# Patient Record
Sex: MALE | Race: WHITE | Employment: OTHER | URBAN - METROPOLITAN AREA
[De-identification: names, ages, dates, MRNs, and addresses within clinical notes are randomized per-mention and may not be internally consistent; named-entity substitution may affect disease eponyms.]

---

## 2017-05-04 ENCOUNTER — HOSPITAL ENCOUNTER (OUTPATIENT)
Dept: OTHER | Age: 82
Discharge: HOME | End: 2017-05-04
Attending: INTERNAL MEDICINE | Admitting: INTERNAL MEDICINE

## 2017-05-10 ENCOUNTER — OFFICE VISIT (OUTPATIENT)
Dept: CARDIOLOGY CLINIC | Age: 82
End: 2017-05-10

## 2017-05-10 VITALS
SYSTOLIC BLOOD PRESSURE: 110 MMHG | HEIGHT: 72 IN | DIASTOLIC BLOOD PRESSURE: 70 MMHG | HEART RATE: 94 BPM | BODY MASS INDEX: 29.39 KG/M2 | OXYGEN SATURATION: 96 % | WEIGHT: 217 LBS

## 2017-05-10 DIAGNOSIS — I42.9 CARDIOMYOPATHY (HCC): ICD-10-CM

## 2017-05-10 DIAGNOSIS — I49.9 IRREGULAR HEART BEAT: ICD-10-CM

## 2017-05-10 DIAGNOSIS — I35.0 NONRHEUMATIC AORTIC VALVE STENOSIS: Primary | ICD-10-CM

## 2017-05-10 DIAGNOSIS — I34.0 NON-RHEUMATIC MITRAL REGURGITATION: Chronic | ICD-10-CM

## 2017-05-10 DIAGNOSIS — I25.10 CORONARY ARTERY DISEASE INVOLVING NATIVE CORONARY ARTERY OF NATIVE HEART WITHOUT ANGINA PECTORIS: ICD-10-CM

## 2017-05-10 PROCEDURE — 93000 ELECTROCARDIOGRAM COMPLETE: CPT | Performed by: INTERNAL MEDICINE

## 2017-05-10 PROCEDURE — 4040F PNEUMOC VAC/ADMIN/RCVD: CPT | Performed by: INTERNAL MEDICINE

## 2017-05-10 PROCEDURE — G8420 CALC BMI NORM PARAMETERS: HCPCS | Performed by: INTERNAL MEDICINE

## 2017-05-10 PROCEDURE — G8427 DOCREV CUR MEDS BY ELIG CLIN: HCPCS | Performed by: INTERNAL MEDICINE

## 2017-05-10 PROCEDURE — G8598 ASA/ANTIPLAT THER USED: HCPCS | Performed by: INTERNAL MEDICINE

## 2017-05-10 PROCEDURE — 1123F ACP DISCUSS/DSCN MKR DOCD: CPT | Performed by: INTERNAL MEDICINE

## 2017-05-10 PROCEDURE — 99215 OFFICE O/P EST HI 40 MIN: CPT | Performed by: INTERNAL MEDICINE

## 2017-05-10 PROCEDURE — 1036F TOBACCO NON-USER: CPT | Performed by: INTERNAL MEDICINE

## 2017-05-10 RX ORDER — TAMSULOSIN HYDROCHLORIDE 0.4 MG/1
0.4 CAPSULE ORAL DAILY
Status: ON HOLD | COMMUNITY
End: 2019-09-11

## 2017-05-10 ASSESSMENT — ENCOUNTER SYMPTOMS
EYE REDNESS: 0
WHEEZING: 0
BLOOD IN STOOL: 0
COUGH: 0
ABDOMINAL DISTENTION: 0
SHORTNESS OF BREATH: 1

## 2017-05-16 RX ORDER — WARFARIN SODIUM 5 MG/1
5 TABLET ORAL DAILY
Status: ON HOLD | COMMUNITY
End: 2017-06-09 | Stop reason: SDUPTHER

## 2017-05-16 RX ORDER — WARFARIN SODIUM 5 MG/1
5 TABLET ORAL DAILY
Qty: 30 TABLET | Refills: 3 | Status: CANCELLED | OUTPATIENT
Start: 2017-05-16

## 2017-05-17 ENCOUNTER — OFFICE VISIT (OUTPATIENT)
Dept: CARDIOLOGY CLINIC | Age: 82
End: 2017-05-17

## 2017-05-17 VITALS
OXYGEN SATURATION: 99 % | WEIGHT: 204 LBS | HEIGHT: 72 IN | SYSTOLIC BLOOD PRESSURE: 108 MMHG | BODY MASS INDEX: 27.63 KG/M2 | HEART RATE: 63 BPM | DIASTOLIC BLOOD PRESSURE: 62 MMHG

## 2017-05-17 DIAGNOSIS — I10 ESSENTIAL HYPERTENSION: ICD-10-CM

## 2017-05-17 DIAGNOSIS — I35.0 SEVERE AORTIC STENOSIS: Primary | ICD-10-CM

## 2017-05-17 DIAGNOSIS — E78.00 PURE HYPERCHOLESTEROLEMIA: ICD-10-CM

## 2017-05-17 DIAGNOSIS — I50.22 CHRONIC SYSTOLIC HEART FAILURE (HCC): ICD-10-CM

## 2017-05-17 DIAGNOSIS — I50.43 ACUTE ON CHRONIC SYSTOLIC AND DIASTOLIC HEART FAILURE, NYHA CLASS 4 (HCC): ICD-10-CM

## 2017-05-17 DIAGNOSIS — I48.0 PAROXYSMAL ATRIAL FIBRILLATION (HCC): ICD-10-CM

## 2017-05-17 DIAGNOSIS — I25.10 CORONARY ARTERY DISEASE INVOLVING NATIVE CORONARY ARTERY OF NATIVE HEART WITHOUT ANGINA PECTORIS: ICD-10-CM

## 2017-05-17 DIAGNOSIS — I34.0 SEVERE MITRAL REGURGITATION: ICD-10-CM

## 2017-05-17 PROCEDURE — G8420 CALC BMI NORM PARAMETERS: HCPCS | Performed by: INTERNAL MEDICINE

## 2017-05-17 PROCEDURE — 93000 ELECTROCARDIOGRAM COMPLETE: CPT | Performed by: INTERNAL MEDICINE

## 2017-05-17 PROCEDURE — 4040F PNEUMOC VAC/ADMIN/RCVD: CPT | Performed by: INTERNAL MEDICINE

## 2017-05-17 PROCEDURE — 1036F TOBACCO NON-USER: CPT | Performed by: INTERNAL MEDICINE

## 2017-05-17 PROCEDURE — 99215 OFFICE O/P EST HI 40 MIN: CPT | Performed by: INTERNAL MEDICINE

## 2017-05-17 PROCEDURE — G8598 ASA/ANTIPLAT THER USED: HCPCS | Performed by: INTERNAL MEDICINE

## 2017-05-17 PROCEDURE — G8427 DOCREV CUR MEDS BY ELIG CLIN: HCPCS | Performed by: INTERNAL MEDICINE

## 2017-05-17 PROCEDURE — 1123F ACP DISCUSS/DSCN MKR DOCD: CPT | Performed by: INTERNAL MEDICINE

## 2017-05-17 RX ORDER — ASPIRIN 81 MG/1
81 TABLET ORAL DAILY
Qty: 30 TABLET | Refills: 3 | Status: SHIPPED | OUTPATIENT
Start: 2017-05-17 | End: 2017-06-15 | Stop reason: SDUPTHER

## 2017-05-17 RX ORDER — WARFARIN SODIUM 5 MG/1
5 TABLET ORAL DAILY
Qty: 30 TABLET | Refills: 3 | Status: SHIPPED | OUTPATIENT
Start: 2017-05-17 | End: 2019-08-08 | Stop reason: SDUPTHER

## 2017-05-17 RX ORDER — ASPIRIN/CALCIUM/MAG/ALUMINUM 325 MG
TABLET ORAL
COMMUNITY
End: 2017-05-17

## 2017-05-17 RX ORDER — FUROSEMIDE 40 MG/1
40 TABLET ORAL DAILY
COMMUNITY
End: 2017-06-15 | Stop reason: SDUPTHER

## 2017-05-21 PROBLEM — I50.43 ACUTE ON CHRONIC SYSTOLIC AND DIASTOLIC HEART FAILURE, NYHA CLASS 4 (HCC): Status: ACTIVE | Noted: 2017-05-21

## 2017-05-22 ENCOUNTER — TELEPHONE (OUTPATIENT)
Dept: CARDIOLOGY CLINIC | Age: 82
End: 2017-05-22

## 2017-05-22 DIAGNOSIS — I35.0 NONRHEUMATIC AORTIC VALVE STENOSIS: Primary | ICD-10-CM

## 2017-05-26 ENCOUNTER — TELEPHONE (OUTPATIENT)
Dept: CARDIOLOGY CLINIC | Age: 82
End: 2017-05-26

## 2017-06-01 ENCOUNTER — HOSPITAL ENCOUNTER (OUTPATIENT)
Dept: CARDIAC CATH/INVASIVE PROCEDURES | Age: 82
Discharge: HOME | End: 2017-06-01
Attending: INTERNAL MEDICINE | Admitting: INTERNAL MEDICINE

## 2017-06-01 VITALS — HEIGHT: 72 IN | WEIGHT: 196 LBS | BODY MASS INDEX: 26.55 KG/M2

## 2017-06-01 RX ORDER — SODIUM CHLORIDE 9 MG/ML
INJECTION, SOLUTION INTRAVENOUS CONTINUOUS
Status: DISCONTINUED | OUTPATIENT
Start: 2017-06-01 | End: 2017-06-02 | Stop reason: HOSPADM

## 2017-06-01 RX ORDER — SODIUM CHLORIDE 0.9 % (FLUSH) 0.9 %
10 SYRINGE (ML) INJECTION EVERY 12 HOURS SCHEDULED
Status: DISCONTINUED | OUTPATIENT
Start: 2017-06-01 | End: 2017-06-02 | Stop reason: HOSPADM

## 2017-06-01 RX ORDER — SODIUM CHLORIDE 0.9 % (FLUSH) 0.9 %
10 SYRINGE (ML) INJECTION PRN
Status: DISCONTINUED | OUTPATIENT
Start: 2017-06-01 | End: 2017-06-02 | Stop reason: HOSPADM

## 2017-06-02 ENCOUNTER — TELEPHONE (OUTPATIENT)
Dept: CARDIOLOGY CLINIC | Age: 82
End: 2017-06-02

## 2017-06-05 DIAGNOSIS — I35.0 NONRHEUMATIC AORTIC VALVE STENOSIS: Primary | ICD-10-CM

## 2017-06-08 ENCOUNTER — HOSPITAL ENCOUNTER (OUTPATIENT)
Dept: NON INVASIVE DIAGNOSTICS | Age: 82
Discharge: HOME | End: 2017-06-08
Attending: INTERNAL MEDICINE | Admitting: INTERNAL MEDICINE

## 2017-06-08 VITALS — HEIGHT: 72 IN | WEIGHT: 198 LBS | BODY MASS INDEX: 26.82 KG/M2

## 2017-06-08 DIAGNOSIS — I35.0 NONRHEUMATIC AORTIC VALVE STENOSIS: ICD-10-CM

## 2017-06-08 RX ORDER — DOBUTAMINE HYDROCHLORIDE 200 MG/100ML
10 INJECTION INTRAVENOUS CONTINUOUS
Status: ACTIVE | OUTPATIENT
Start: 2017-06-08 | End: 2017-06-08

## 2017-06-08 RX ORDER — METOPROLOL TARTRATE 5 MG/5ML
5 INJECTION INTRAVENOUS EVERY 6 HOURS
Status: DISCONTINUED | OUTPATIENT
Start: 2017-06-08 | End: 2017-06-08 | Stop reason: ALTCHOICE

## 2017-06-08 RX ADMIN — METOPROLOL TARTRATE 5 MG: 5 INJECTION INTRAVENOUS at 08:18

## 2017-06-08 RX ADMIN — DOBUTAMINE HYDROCHLORIDE 10 MCG/KG/MIN: 200 INJECTION INTRAVENOUS at 08:11

## 2017-06-09 DIAGNOSIS — R09.89 BRUIT: ICD-10-CM

## 2017-06-09 DIAGNOSIS — I35.0 NONRHEUMATIC AORTIC VALVE STENOSIS: Primary | ICD-10-CM

## 2017-06-09 DIAGNOSIS — R06.02 SOB (SHORTNESS OF BREATH): ICD-10-CM

## 2017-06-15 ENCOUNTER — OFFICE VISIT (OUTPATIENT)
Dept: CARDIOLOGY CLINIC | Age: 82
End: 2017-06-15

## 2017-06-15 VITALS
OXYGEN SATURATION: 94 % | BODY MASS INDEX: 28.17 KG/M2 | HEIGHT: 72 IN | HEART RATE: 92 BPM | WEIGHT: 208 LBS | DIASTOLIC BLOOD PRESSURE: 70 MMHG | SYSTOLIC BLOOD PRESSURE: 110 MMHG

## 2017-06-15 DIAGNOSIS — E78.00 PURE HYPERCHOLESTEROLEMIA: ICD-10-CM

## 2017-06-15 DIAGNOSIS — I42.9 CARDIOMYOPATHY (HCC): ICD-10-CM

## 2017-06-15 DIAGNOSIS — I35.0 NONRHEUMATIC AORTIC VALVE STENOSIS: Primary | ICD-10-CM

## 2017-06-15 DIAGNOSIS — I10 ESSENTIAL HYPERTENSION: ICD-10-CM

## 2017-06-15 DIAGNOSIS — I25.10 CORONARY ARTERY DISEASE INVOLVING NATIVE CORONARY ARTERY OF NATIVE HEART WITHOUT ANGINA PECTORIS: ICD-10-CM

## 2017-06-15 PROCEDURE — G8598 ASA/ANTIPLAT THER USED: HCPCS | Performed by: INTERNAL MEDICINE

## 2017-06-15 PROCEDURE — 4040F PNEUMOC VAC/ADMIN/RCVD: CPT | Performed by: INTERNAL MEDICINE

## 2017-06-15 PROCEDURE — G8427 DOCREV CUR MEDS BY ELIG CLIN: HCPCS | Performed by: INTERNAL MEDICINE

## 2017-06-15 PROCEDURE — G8419 CALC BMI OUT NRM PARAM NOF/U: HCPCS | Performed by: INTERNAL MEDICINE

## 2017-06-15 PROCEDURE — 1111F DSCHRG MED/CURRENT MED MERGE: CPT | Performed by: INTERNAL MEDICINE

## 2017-06-15 PROCEDURE — 1123F ACP DISCUSS/DSCN MKR DOCD: CPT | Performed by: INTERNAL MEDICINE

## 2017-06-15 PROCEDURE — 99024 POSTOP FOLLOW-UP VISIT: CPT | Performed by: INTERNAL MEDICINE

## 2017-06-15 PROCEDURE — 1036F TOBACCO NON-USER: CPT | Performed by: INTERNAL MEDICINE

## 2017-06-15 RX ORDER — FUROSEMIDE 40 MG/1
40 TABLET ORAL DAILY
Qty: 90 TABLET | Refills: 2 | Status: SHIPPED | OUTPATIENT
Start: 2017-06-15 | End: 2017-11-09 | Stop reason: DRUGHIGH

## 2017-06-15 RX ORDER — LISINOPRIL 2.5 MG/1
2.5 TABLET ORAL DAILY
Qty: 90 TABLET | Refills: 2 | Status: SHIPPED | OUTPATIENT
Start: 2017-06-15 | End: 2017-11-27

## 2017-06-15 RX ORDER — ASPIRIN 81 MG/1
81 TABLET ORAL DAILY
Qty: 90 TABLET | Refills: 2 | Status: SHIPPED | OUTPATIENT
Start: 2017-06-15 | End: 2018-03-12 | Stop reason: SDUPTHER

## 2017-06-15 ASSESSMENT — ENCOUNTER SYMPTOMS
BLOOD IN STOOL: 0
WHEEZING: 0
SHORTNESS OF BREATH: 1
COUGH: 0
ABDOMINAL DISTENTION: 0
EYE REDNESS: 0

## 2017-07-03 ENCOUNTER — TELEPHONE (OUTPATIENT)
Dept: CARDIOLOGY CLINIC | Age: 82
End: 2017-07-03

## 2017-07-13 ENCOUNTER — HOSPITAL ENCOUNTER (OUTPATIENT)
Dept: VASCULAR LAB | Age: 82
Discharge: HOME | End: 2017-07-13
Attending: INTERNAL MEDICINE | Admitting: INTERNAL MEDICINE

## 2017-07-13 ENCOUNTER — OFFICE VISIT (OUTPATIENT)
Dept: CARDIOLOGY CLINIC | Age: 82
End: 2017-07-13

## 2017-07-13 VITALS
DIASTOLIC BLOOD PRESSURE: 70 MMHG | OXYGEN SATURATION: 97 % | BODY MASS INDEX: 27.68 KG/M2 | SYSTOLIC BLOOD PRESSURE: 112 MMHG | WEIGHT: 204.4 LBS | HEART RATE: 90 BPM | HEIGHT: 72 IN

## 2017-07-13 DIAGNOSIS — I35.9 NONRHEUMATIC AORTIC VALVE DISORDER: ICD-10-CM

## 2017-07-13 DIAGNOSIS — I25.10 CORONARY ARTERY DISEASE INVOLVING NATIVE CORONARY ARTERY OF NATIVE HEART WITHOUT ANGINA PECTORIS: ICD-10-CM

## 2017-07-13 DIAGNOSIS — I10 ESSENTIAL HYPERTENSION: ICD-10-CM

## 2017-07-13 DIAGNOSIS — R09.89 BRUIT: ICD-10-CM

## 2017-07-13 DIAGNOSIS — I35.0 NONRHEUMATIC AORTIC VALVE STENOSIS: Primary | ICD-10-CM

## 2017-07-13 DIAGNOSIS — I48.0 PAROXYSMAL ATRIAL FIBRILLATION (HCC): ICD-10-CM

## 2017-07-13 DIAGNOSIS — I35.0 NONRHEUMATIC AORTIC VALVE STENOSIS: ICD-10-CM

## 2017-07-13 PROCEDURE — 4040F PNEUMOC VAC/ADMIN/RCVD: CPT | Performed by: INTERNAL MEDICINE

## 2017-07-13 PROCEDURE — 99214 OFFICE O/P EST MOD 30 MIN: CPT | Performed by: INTERNAL MEDICINE

## 2017-07-13 PROCEDURE — G8598 ASA/ANTIPLAT THER USED: HCPCS | Performed by: INTERNAL MEDICINE

## 2017-07-13 PROCEDURE — G8419 CALC BMI OUT NRM PARAM NOF/U: HCPCS | Performed by: INTERNAL MEDICINE

## 2017-07-13 PROCEDURE — G8427 DOCREV CUR MEDS BY ELIG CLIN: HCPCS | Performed by: INTERNAL MEDICINE

## 2017-07-13 PROCEDURE — 1123F ACP DISCUSS/DSCN MKR DOCD: CPT | Performed by: INTERNAL MEDICINE

## 2017-07-13 PROCEDURE — 1036F TOBACCO NON-USER: CPT | Performed by: INTERNAL MEDICINE

## 2017-07-19 ENCOUNTER — OFFICE VISIT (OUTPATIENT)
Dept: CARDIOTHORACIC SURGERY | Age: 82
End: 2017-07-19

## 2017-07-19 VITALS
SYSTOLIC BLOOD PRESSURE: 100 MMHG | BODY MASS INDEX: 27.44 KG/M2 | HEART RATE: 58 BPM | TEMPERATURE: 97.7 F | WEIGHT: 202.6 LBS | HEIGHT: 72 IN | OXYGEN SATURATION: 95 % | DIASTOLIC BLOOD PRESSURE: 70 MMHG

## 2017-07-19 DIAGNOSIS — I50.42 CHRONIC COMBINED SYSTOLIC AND DIASTOLIC CONGESTIVE HEART FAILURE (HCC): ICD-10-CM

## 2017-07-19 DIAGNOSIS — I42.9 CARDIOMYOPATHY (HCC): ICD-10-CM

## 2017-07-19 DIAGNOSIS — I35.0 NONRHEUMATIC AORTIC VALVE STENOSIS: Primary | ICD-10-CM

## 2017-07-19 DIAGNOSIS — I34.0 NON-RHEUMATIC MITRAL REGURGITATION: ICD-10-CM

## 2017-07-19 PROCEDURE — 99215 OFFICE O/P EST HI 40 MIN: CPT | Performed by: THORACIC SURGERY (CARDIOTHORACIC VASCULAR SURGERY)

## 2017-07-19 ASSESSMENT — ENCOUNTER SYMPTOMS
SHORTNESS OF BREATH: 1
NAUSEA: 0
ABDOMINAL PAIN: 0
SINUS PRESSURE: 0
EYE REDNESS: 0
COUGH: 1
EYE DISCHARGE: 0
EYE PAIN: 0
CHEST TIGHTNESS: 0
SORE THROAT: 0
VOMITING: 0
BACK PAIN: 0

## 2017-07-24 ENCOUNTER — TELEPHONE (OUTPATIENT)
Dept: CARDIOLOGY CLINIC | Age: 82
End: 2017-07-24

## 2017-07-25 ENCOUNTER — TELEPHONE (OUTPATIENT)
Dept: CARDIOLOGY CLINIC | Age: 82
End: 2017-07-25

## 2017-07-27 ENCOUNTER — OFFICE VISIT (OUTPATIENT)
Dept: CARDIOLOGY CLINIC | Age: 82
End: 2017-07-27

## 2017-07-27 ENCOUNTER — HOSPITAL ENCOUNTER (OUTPATIENT)
Dept: OTHER | Age: 82
Discharge: HOME | End: 2017-07-27
Attending: INTERNAL MEDICINE | Admitting: INTERNAL MEDICINE

## 2017-07-27 VITALS
HEIGHT: 72 IN | HEART RATE: 56 BPM | DIASTOLIC BLOOD PRESSURE: 60 MMHG | WEIGHT: 201 LBS | BODY MASS INDEX: 27.22 KG/M2 | SYSTOLIC BLOOD PRESSURE: 110 MMHG

## 2017-07-27 DIAGNOSIS — Z01.811 PRE-OP CHEST EXAM: ICD-10-CM

## 2017-07-27 DIAGNOSIS — I48.20 CHRONIC ATRIAL FIBRILLATION (HCC): ICD-10-CM

## 2017-07-27 DIAGNOSIS — I25.10 CORONARY ARTERY DISEASE INVOLVING NATIVE CORONARY ARTERY OF NATIVE HEART WITHOUT ANGINA PECTORIS: ICD-10-CM

## 2017-07-27 DIAGNOSIS — I35.0 NONRHEUMATIC AORTIC VALVE STENOSIS: Primary | ICD-10-CM

## 2017-07-27 LAB
ABO/RH: NORMAL
ALBUMIN SERPL-MCNC: 4.2 G/DL (ref 3.4–5)
ALP BLD-CCNC: 45 U/L (ref 40–129)
ALT SERPL-CCNC: 9 U/L (ref 10–40)
ANION GAP SERPL CALCULATED.3IONS-SCNC: 14 MMOL/L (ref 3–16)
ANTIBODY SCREEN: NORMAL
AST SERPL-CCNC: 13 U/L (ref 15–37)
BASOPHILS ABSOLUTE: 0 K/UL (ref 0–0.2)
BASOPHILS RELATIVE PERCENT: 0.2 %
BILIRUB SERPL-MCNC: 0.7 MG/DL (ref 0–1)
BILIRUBIN DIRECT: <0.2 MG/DL (ref 0–0.3)
BILIRUBIN URINE: NEGATIVE
BILIRUBIN, INDIRECT: ABNORMAL MG/DL (ref 0–1)
BLOOD, URINE: NEGATIVE
BUN BLDV-MCNC: 35 MG/DL (ref 7–20)
CALCIUM SERPL-MCNC: 9.5 MG/DL (ref 8.3–10.6)
CHLORIDE BLD-SCNC: 102 MMOL/L (ref 99–110)
CLARITY: CLEAR
CO2: 27 MMOL/L (ref 21–32)
COLOR: YELLOW
CREAT SERPL-MCNC: 1.1 MG/DL (ref 0.8–1.3)
EOSINOPHILS ABSOLUTE: 0.1 K/UL (ref 0–0.6)
EOSINOPHILS RELATIVE PERCENT: 1.8 %
EPITHELIAL CELLS, UA: 1 /HPF (ref 0–5)
GFR AFRICAN AMERICAN: >60
GFR NON-AFRICAN AMERICAN: >60
GLUCOSE BLD-MCNC: 123 MG/DL (ref 70–99)
GLUCOSE URINE: NEGATIVE MG/DL
HCT VFR BLD CALC: 35 % (ref 40.5–52.5)
HEMOGLOBIN: 11.7 G/DL (ref 13.5–17.5)
HYALINE CASTS: 3 /LPF (ref 0–8)
INR BLD: 2.54 (ref 0.85–1.15)
KETONES, URINE: NEGATIVE MG/DL
LEUKOCYTE ESTERASE, URINE: ABNORMAL
LYMPHOCYTES ABSOLUTE: 1.2 K/UL (ref 1–5.1)
LYMPHOCYTES RELATIVE PERCENT: 20.6 %
MAGNESIUM: 1.6 MG/DL (ref 1.8–2.4)
MCH RBC QN AUTO: 33.2 PG (ref 26–34)
MCHC RBC AUTO-ENTMCNC: 33.4 G/DL (ref 31–36)
MCV RBC AUTO: 99.6 FL (ref 80–100)
MICROSCOPIC EXAMINATION: YES
MONOCYTES ABSOLUTE: 0.7 K/UL (ref 0–1.3)
MONOCYTES RELATIVE PERCENT: 10.9 %
NEUTROPHILS ABSOLUTE: 4 K/UL (ref 1.7–7.7)
NEUTROPHILS RELATIVE PERCENT: 66.5 %
NITRITE, URINE: NEGATIVE
PDW BLD-RTO: 14.6 % (ref 12.4–15.4)
PH UA: 5
PLATELET # BLD: 229 K/UL (ref 135–450)
PMV BLD AUTO: 8.7 FL (ref 5–10.5)
POTASSIUM SERPL-SCNC: 4.2 MMOL/L (ref 3.5–5.1)
PRO-BNP: 3829 PG/ML (ref 0–449)
PROTEIN UA: NEGATIVE MG/DL
PROTHROMBIN TIME: 28.7 SEC (ref 9.6–13)
RBC # BLD: 3.51 M/UL (ref 4.2–5.9)
RBC UA: 1 /HPF (ref 0–4)
SODIUM BLD-SCNC: 143 MMOL/L (ref 136–145)
SPECIFIC GRAVITY UA: 1.01
TOTAL PROTEIN: 9.3 G/DL (ref 6.4–8.2)
TROPONIN: <0.01 NG/ML
URINE REFLEX TO CULTURE: YES
URINE TYPE: ABNORMAL
UROBILINOGEN, URINE: 0.2 E.U./DL
WBC # BLD: 6 K/UL (ref 4–11)
WBC UA: 24 /HPF (ref 0–5)

## 2017-07-27 PROCEDURE — G8598 ASA/ANTIPLAT THER USED: HCPCS | Performed by: INTERNAL MEDICINE

## 2017-07-27 PROCEDURE — G8427 DOCREV CUR MEDS BY ELIG CLIN: HCPCS | Performed by: INTERNAL MEDICINE

## 2017-07-27 PROCEDURE — G8419 CALC BMI OUT NRM PARAM NOF/U: HCPCS | Performed by: INTERNAL MEDICINE

## 2017-07-27 PROCEDURE — 1036F TOBACCO NON-USER: CPT | Performed by: INTERNAL MEDICINE

## 2017-07-27 PROCEDURE — 99214 OFFICE O/P EST MOD 30 MIN: CPT | Performed by: INTERNAL MEDICINE

## 2017-07-27 PROCEDURE — 1123F ACP DISCUSS/DSCN MKR DOCD: CPT | Performed by: INTERNAL MEDICINE

## 2017-07-27 PROCEDURE — 4040F PNEUMOC VAC/ADMIN/RCVD: CPT | Performed by: INTERNAL MEDICINE

## 2017-07-28 ENCOUNTER — TELEPHONE (OUTPATIENT)
Dept: CARDIOLOGY | Age: 82
End: 2017-07-28

## 2017-07-28 LAB — URINE CULTURE, ROUTINE: NORMAL

## 2017-07-31 ENCOUNTER — TELEPHONE (OUTPATIENT)
Dept: CARDIOLOGY CLINIC | Age: 82
End: 2017-07-31

## 2017-08-03 LAB
EKG ATRIAL RATE: 416 BPM
EKG DIAGNOSIS: NORMAL
EKG Q-T INTERVAL: 374 MS
EKG QRS DURATION: 90 MS
EKG QTC CALCULATION (BAZETT): 455 MS
EKG R AXIS: 9 DEGREES
EKG T AXIS: 69 DEGREES
EKG VENTRICULAR RATE: 89 BPM

## 2017-08-04 ENCOUNTER — TELEPHONE (OUTPATIENT)
Dept: CARDIOLOGY CLINIC | Age: 82
End: 2017-08-04

## 2017-08-17 ENCOUNTER — OFFICE VISIT (OUTPATIENT)
Dept: CARDIOLOGY CLINIC | Age: 82
End: 2017-08-17

## 2017-08-17 VITALS
HEIGHT: 72 IN | HEART RATE: 80 BPM | BODY MASS INDEX: 27.65 KG/M2 | WEIGHT: 204.12 LBS | SYSTOLIC BLOOD PRESSURE: 126 MMHG | DIASTOLIC BLOOD PRESSURE: 74 MMHG

## 2017-08-17 DIAGNOSIS — I42.8 NICM (NONISCHEMIC CARDIOMYOPATHY) (HCC): ICD-10-CM

## 2017-08-17 DIAGNOSIS — I25.10 CORONARY ARTERY DISEASE INVOLVING NATIVE CORONARY ARTERY OF NATIVE HEART WITHOUT ANGINA PECTORIS: ICD-10-CM

## 2017-08-17 DIAGNOSIS — I35.0 NONRHEUMATIC AORTIC VALVE STENOSIS: Primary | ICD-10-CM

## 2017-08-17 DIAGNOSIS — I48.20 CHRONIC ATRIAL FIBRILLATION (HCC): ICD-10-CM

## 2017-08-17 PROCEDURE — 99214 OFFICE O/P EST MOD 30 MIN: CPT | Performed by: INTERNAL MEDICINE

## 2017-08-17 PROCEDURE — 1123F ACP DISCUSS/DSCN MKR DOCD: CPT | Performed by: INTERNAL MEDICINE

## 2017-08-17 PROCEDURE — 1036F TOBACCO NON-USER: CPT | Performed by: INTERNAL MEDICINE

## 2017-08-17 PROCEDURE — G8598 ASA/ANTIPLAT THER USED: HCPCS | Performed by: INTERNAL MEDICINE

## 2017-08-17 PROCEDURE — G8427 DOCREV CUR MEDS BY ELIG CLIN: HCPCS | Performed by: INTERNAL MEDICINE

## 2017-08-17 PROCEDURE — G8419 CALC BMI OUT NRM PARAM NOF/U: HCPCS | Performed by: INTERNAL MEDICINE

## 2017-08-17 PROCEDURE — 4040F PNEUMOC VAC/ADMIN/RCVD: CPT | Performed by: INTERNAL MEDICINE

## 2017-08-17 PROCEDURE — 1111F DSCHRG MED/CURRENT MED MERGE: CPT | Performed by: INTERNAL MEDICINE

## 2017-09-07 ENCOUNTER — HOSPITAL ENCOUNTER (OUTPATIENT)
Dept: NON INVASIVE DIAGNOSTICS | Age: 82
Discharge: HOME | End: 2017-09-07
Attending: INTERNAL MEDICINE | Admitting: INTERNAL MEDICINE

## 2017-09-07 ENCOUNTER — OFFICE VISIT (OUTPATIENT)
Dept: CARDIOLOGY CLINIC | Age: 82
End: 2017-09-07

## 2017-09-07 VITALS
HEIGHT: 72 IN | HEART RATE: 78 BPM | WEIGHT: 202 LBS | SYSTOLIC BLOOD PRESSURE: 122 MMHG | DIASTOLIC BLOOD PRESSURE: 60 MMHG | BODY MASS INDEX: 27.36 KG/M2

## 2017-09-07 DIAGNOSIS — I35.0 NONRHEUMATIC AORTIC VALVE STENOSIS: ICD-10-CM

## 2017-09-07 DIAGNOSIS — Z95.2 S/P TAVR (TRANSCATHETER AORTIC VALVE REPLACEMENT): ICD-10-CM

## 2017-09-07 DIAGNOSIS — I48.20 CHRONIC ATRIAL FIBRILLATION (HCC): ICD-10-CM

## 2017-09-07 DIAGNOSIS — I25.10 CORONARY ARTERY DISEASE INVOLVING NATIVE CORONARY ARTERY OF NATIVE HEART WITHOUT ANGINA PECTORIS: ICD-10-CM

## 2017-09-07 DIAGNOSIS — I35.0 NONRHEUMATIC AORTIC VALVE STENOSIS: Primary | ICD-10-CM

## 2017-09-07 LAB
LEFT VENTRICULAR EJECTION FRACTION HIGH VALUE: 30 %
LEFT VENTRICULAR EJECTION FRACTION MODE: NORMAL
LV EF: 25 %
LV EF: 28 %
LVEF MODALITY: NORMAL

## 2017-09-07 PROCEDURE — 1036F TOBACCO NON-USER: CPT | Performed by: INTERNAL MEDICINE

## 2017-09-07 PROCEDURE — G8598 ASA/ANTIPLAT THER USED: HCPCS | Performed by: INTERNAL MEDICINE

## 2017-09-07 PROCEDURE — G8417 CALC BMI ABV UP PARAM F/U: HCPCS | Performed by: INTERNAL MEDICINE

## 2017-09-07 PROCEDURE — 99214 OFFICE O/P EST MOD 30 MIN: CPT | Performed by: INTERNAL MEDICINE

## 2017-09-07 PROCEDURE — 4040F PNEUMOC VAC/ADMIN/RCVD: CPT | Performed by: INTERNAL MEDICINE

## 2017-09-07 PROCEDURE — G8427 DOCREV CUR MEDS BY ELIG CLIN: HCPCS | Performed by: INTERNAL MEDICINE

## 2017-09-07 PROCEDURE — 1123F ACP DISCUSS/DSCN MKR DOCD: CPT | Performed by: INTERNAL MEDICINE

## 2017-09-19 ENCOUNTER — OFFICE VISIT (OUTPATIENT)
Dept: CARDIOLOGY CLINIC | Age: 82
End: 2017-09-19

## 2017-09-19 VITALS
BODY MASS INDEX: 28.17 KG/M2 | SYSTOLIC BLOOD PRESSURE: 122 MMHG | OXYGEN SATURATION: 96 % | DIASTOLIC BLOOD PRESSURE: 70 MMHG | HEIGHT: 72 IN | WEIGHT: 208 LBS

## 2017-09-19 DIAGNOSIS — E78.00 PURE HYPERCHOLESTEROLEMIA: ICD-10-CM

## 2017-09-19 DIAGNOSIS — I10 ESSENTIAL HYPERTENSION: ICD-10-CM

## 2017-09-19 DIAGNOSIS — I50.22 CHRONIC SYSTOLIC HF (HEART FAILURE) (HCC): ICD-10-CM

## 2017-09-19 DIAGNOSIS — Z95.2 S/P TAVR (TRANSCATHETER AORTIC VALVE REPLACEMENT): ICD-10-CM

## 2017-09-19 DIAGNOSIS — I25.10 CORONARY ARTERY DISEASE INVOLVING NATIVE CORONARY ARTERY OF NATIVE HEART WITHOUT ANGINA PECTORIS: Primary | ICD-10-CM

## 2017-09-19 DIAGNOSIS — I25.5 ISCHEMIC CARDIOMYOPATHY: ICD-10-CM

## 2017-09-19 PROCEDURE — 99214 OFFICE O/P EST MOD 30 MIN: CPT | Performed by: INTERNAL MEDICINE

## 2017-09-19 PROCEDURE — 1123F ACP DISCUSS/DSCN MKR DOCD: CPT | Performed by: INTERNAL MEDICINE

## 2017-09-19 PROCEDURE — 1036F TOBACCO NON-USER: CPT | Performed by: INTERNAL MEDICINE

## 2017-09-19 PROCEDURE — G8417 CALC BMI ABV UP PARAM F/U: HCPCS | Performed by: INTERNAL MEDICINE

## 2017-09-19 PROCEDURE — 4040F PNEUMOC VAC/ADMIN/RCVD: CPT | Performed by: INTERNAL MEDICINE

## 2017-09-19 PROCEDURE — G8598 ASA/ANTIPLAT THER USED: HCPCS | Performed by: INTERNAL MEDICINE

## 2017-09-19 PROCEDURE — G8427 DOCREV CUR MEDS BY ELIG CLIN: HCPCS | Performed by: INTERNAL MEDICINE

## 2017-10-25 ENCOUNTER — TELEPHONE (OUTPATIENT)
Dept: CARDIOLOGY CLINIC | Age: 82
End: 2017-10-25

## 2017-10-31 ENCOUNTER — TELEPHONE (OUTPATIENT)
Dept: CARDIOLOGY CLINIC | Age: 82
End: 2017-10-31

## 2017-11-06 RX ORDER — METOPROLOL TARTRATE 50 MG/1
150 TABLET, FILM COATED ORAL DAILY
COMMUNITY
End: 2017-11-06 | Stop reason: SDUPTHER

## 2017-11-06 RX ORDER — METOPROLOL TARTRATE 50 MG/1
TABLET, FILM COATED ORAL
Qty: 45 TABLET | Refills: 5 | Status: SHIPPED | OUTPATIENT
Start: 2017-11-06 | End: 2017-11-09 | Stop reason: SDUPTHER

## 2017-11-06 NOTE — TELEPHONE ENCOUNTER
(please see previous telephone encounters and cardiac rehab physician response request scanned into media)  Patient's metoprolol was increased to 75 mg BID. He had plenty of pills, but he is now requesting a refill. Patient would like to know if there is any way to increase the mg of the pill so that is is not taking as many pills in one day. He would like the prescription sent in to [facility] in [place].

## 2017-11-09 ENCOUNTER — TELEPHONE (OUTPATIENT)
Dept: CARDIOLOGY CLINIC | Age: 82
End: 2017-11-09

## 2017-11-09 RX ORDER — FUROSEMIDE 40 MG/1
40 TABLET ORAL 2 TIMES DAILY
COMMUNITY
End: 2017-11-09 | Stop reason: SDUPTHER

## 2017-11-09 RX ORDER — METOPROLOL TARTRATE 50 MG/1
TABLET, FILM COATED ORAL
Qty: 270 TABLET | Refills: 3 | Status: SHIPPED | OUTPATIENT
Start: 2017-11-09 | End: 2018-01-08 | Stop reason: SDUPTHER

## 2017-11-09 RX ORDER — FUROSEMIDE 40 MG/1
40 TABLET ORAL 2 TIMES DAILY
Qty: 180 TABLET | Refills: 3 | Status: SHIPPED | OUTPATIENT
Start: 2017-11-09 | End: 2017-11-27 | Stop reason: DRUGHIGH

## 2017-11-27 ENCOUNTER — OFFICE VISIT (OUTPATIENT)
Dept: CARDIOLOGY CLINIC | Age: 82
End: 2017-11-27

## 2017-11-27 VITALS
BODY MASS INDEX: 27.5 KG/M2 | SYSTOLIC BLOOD PRESSURE: 110 MMHG | DIASTOLIC BLOOD PRESSURE: 60 MMHG | HEIGHT: 72 IN | OXYGEN SATURATION: 99 % | HEART RATE: 89 BPM | WEIGHT: 203 LBS

## 2017-11-27 DIAGNOSIS — I34.0 NON-RHEUMATIC MITRAL REGURGITATION: Chronic | ICD-10-CM

## 2017-11-27 DIAGNOSIS — I10 ESSENTIAL HYPERTENSION: ICD-10-CM

## 2017-11-27 DIAGNOSIS — E78.00 PURE HYPERCHOLESTEROLEMIA: ICD-10-CM

## 2017-11-27 DIAGNOSIS — I50.22 CHRONIC SYSTOLIC CONGESTIVE HEART FAILURE (HCC): ICD-10-CM

## 2017-11-27 DIAGNOSIS — I35.0 NONRHEUMATIC AORTIC VALVE STENOSIS: ICD-10-CM

## 2017-11-27 DIAGNOSIS — I25.10 CORONARY ARTERY DISEASE INVOLVING NATIVE CORONARY ARTERY OF NATIVE HEART WITHOUT ANGINA PECTORIS: Primary | ICD-10-CM

## 2017-11-27 DIAGNOSIS — I25.5 ISCHEMIC CARDIOMYOPATHY: ICD-10-CM

## 2017-11-27 PROBLEM — I50.43 ACUTE ON CHRONIC SYSTOLIC AND DIASTOLIC HEART FAILURE, NYHA CLASS 4 (HCC): Status: RESOLVED | Noted: 2017-05-21 | Resolved: 2017-11-27

## 2017-11-27 PROCEDURE — 1123F ACP DISCUSS/DSCN MKR DOCD: CPT | Performed by: INTERNAL MEDICINE

## 2017-11-27 PROCEDURE — G8598 ASA/ANTIPLAT THER USED: HCPCS | Performed by: INTERNAL MEDICINE

## 2017-11-27 PROCEDURE — 1036F TOBACCO NON-USER: CPT | Performed by: INTERNAL MEDICINE

## 2017-11-27 PROCEDURE — 4040F PNEUMOC VAC/ADMIN/RCVD: CPT | Performed by: INTERNAL MEDICINE

## 2017-11-27 PROCEDURE — 99214 OFFICE O/P EST MOD 30 MIN: CPT | Performed by: INTERNAL MEDICINE

## 2017-11-27 PROCEDURE — G8417 CALC BMI ABV UP PARAM F/U: HCPCS | Performed by: INTERNAL MEDICINE

## 2017-11-27 PROCEDURE — G8427 DOCREV CUR MEDS BY ELIG CLIN: HCPCS | Performed by: INTERNAL MEDICINE

## 2017-11-27 PROCEDURE — G8484 FLU IMMUNIZE NO ADMIN: HCPCS | Performed by: INTERNAL MEDICINE

## 2017-11-27 RX ORDER — CLOPIDOGREL BISULFATE 75 MG/1
75 TABLET ORAL DAILY
COMMUNITY
End: 2017-11-27

## 2017-11-27 RX ORDER — FUROSEMIDE 40 MG/1
40 TABLET ORAL DAILY
Qty: 90 TABLET | Refills: 2 | Status: SHIPPED
Start: 2017-11-27 | End: 2018-06-13 | Stop reason: HOSPADM

## 2017-11-27 ASSESSMENT — ENCOUNTER SYMPTOMS
ABDOMINAL DISTENTION: 0
EYE REDNESS: 0
COUGH: 0
SHORTNESS OF BREATH: 1
WHEEZING: 0
BLOOD IN STOOL: 0

## 2017-11-27 NOTE — PROGRESS NOTES
Subjective:      Patient ID: Danette Santos is a 80 y.o. male. Reason for visit: hypotension, falls  CC: \"I feel. \"    HPI Danette Santos is here at the request of the [facility] for evaluation of hypotension and falls. He was seen in the ED 11/20/17 after falling. His BB dosage was reduced. Today he denies chest pain, palpitations, syncope, worsening leg swelling and worsening dyspnea. He is participating in cardiac rehab. Review of Systems   Constitutional: Negative for activity change, appetite change, chills, fatigue, fever and unexpected weight change. HENT: Negative for congestion, nosebleeds and tinnitus. Eyes: Negative for redness and visual disturbance. Respiratory: Positive for shortness of breath. Negative for cough and wheezing. Cardiovascular: Positive for leg swelling. Negative for chest pain and palpitations. Gastrointestinal: Negative for abdominal distention and blood in stool. Genitourinary: Negative for dysuria and hematuria. Musculoskeletal: Negative for gait problem and myalgias. Neurological: Positive for dizziness. Negative for speech difficulty. Hematological: Bruises/bleeds easily. Psychiatric/Behavioral: Negative for behavioral problems and confusion. All other systems reviewed and are negative. Objective:   Physical Exam   Constitutional: He is oriented to person, place, and time. He appears well-developed and well-nourished. HENT:   Head: Normocephalic and atraumatic. Ecchymosis. Laceration left eye   Eyes: Conjunctivae are normal. Right eye exhibits no discharge. Left eye exhibits no discharge. Neck: Normal range of motion. Neck supple. Cardiovascular: Normal rate, normal heart sounds and intact distal pulses. irregular   Pulmonary/Chest: Effort normal and breath sounds normal.   Abdominal: Soft. Bowel sounds are normal.   Musculoskeletal: Normal range of motion. He exhibits no edema.    Neurological: He is alert and oriented to person, place, and time.   Skin: Skin is warm and dry. Psychiatric: He has a normal mood and affect. His behavior is normal.   Nursing note and vitals reviewed. Blood pressure 110/60, pulse 89, height 6' (1.829 m), weight 203 lb (92.1 kg), SpO2 99 %. Vitals:    11/27/17 1219 11/27/17 1221 11/27/17 1225   BP: 122/80 100/68 110/60   Site: Left Arm Left Arm Left Arm   Position: Supine Sitting Standing   Cuff Size: Medium Adult Medium Adult Medium Adult   Pulse: 52 50 89   SpO2: 99% 99% 99%   Weight: 203 lb (92.1 kg)     Height: 6' (1.829 m)       Body mass index is 27.53 kg/m². Wt Readings from Last 3 Encounters:   11/27/17 203 lb (92.1 kg)   09/19/17 208 lb (94.3 kg)   09/07/17 202 lb (91.6 kg)     BP Readings from Last 3 Encounters:   11/27/17 110/60   09/19/17 122/70   09/07/17 122/60        Current Outpatient Prescriptions   Medication Sig Dispense Refill    clopidogrel (PLAVIX) 75 MG tablet Take 75 mg by mouth daily      furosemide (LASIX) 40 MG tablet Take 1 tablet by mouth 2 times daily 180 tablet 3    metoprolol tartrate (LOPRESSOR) 50 MG tablet Take 75 mg (1.5 tablets) twice daily 270 tablet 3    metFORMIN (GLUCOPHAGE) 1000 MG tablet Take 1 tablet by mouth 2 times daily (with meals) 60 tablet 3    aspirin EC 81 MG EC tablet Take 1 tablet by mouth daily 90 tablet 2    lisinopril (PRINIVIL;ZESTRIL) 2.5 MG tablet Take 1 tablet by mouth daily 90 tablet 2    warfarin (COUMADIN) 5 MG tablet Take 1 tablet by mouth daily (Patient taking differently: Take 5 mg by mouth daily As directed by coumadin clinic) 30 tablet 3    tamsulosin (FLOMAX) 0.4 MG capsule Take 0.4 mg by mouth daily      Potassium Gluconate 550 MG TABS Take 99 mg by mouth daily       simvastatin (ZOCOR) 40 MG tablet Take 40 mg by mouth nightly.  allopurinol (ZYLOPRIM) 100 MG tablet Take 100 mg by mouth daily. No current facility-administered medications for this visit.       Past Surgical History:   Procedure Laterality Date    APPENDECTOMY  196    CARDIAC VALVE REPLACEMENT      CARPAL TUNNEL RELEASE Right     2009    CATARACT REMOVAL Bilateral 2010    CHOLECYSTECTOMY      CORONARY ANGIOPLASTY WITH STENT PLACEMENT  2008    mid RCA, BMS    CYST REMOVAL  2003    Lumbar region   [address]    INGUINAL HERNIA REPAIR Bilateral 's     Social History   Substance Use Topics    Smoking status: Former Smoker     Packs/day: 1.00     Years: 17.00     Start date: 1952     Quit date: 1969    Smokeless tobacco: Never Used    Alcohol use 4.2 oz/week     7 Cans of beer per week      Comment: 4-8 cans of beer per week     No Known Allergies  Family History   Problem Relation Age of Onset    Heart Disease Mother       at age 59     Heart Disease Father       at age 68    Cancer Sister       from liver cancer    Heart Disease Brother       at age 76       Recent labs and imaging reviewed. Assessment:       CAD (coronary artery disease)      s/p prox RCA BMS 08, severe prox Diag 1, normal LVEF. Cath 2017 50% LAD, 80%  Diagonal, 40% LCx, 70% mid PDA, patent RCA stent, LVEF 30-35%, moderate-severe MR, severe AS.  Diabetes      managed by PCP    Hyperlipidemia      LDL 61    HTN (hypertension)      Controlled. BP at [address] 93/69. BB dose previously reduced        Dizziness likely secondary to medications. Mild orthostatic changes noted. Cardiomyopathy- Echo 2017 LVEF 25-30%, mod-severely reduced RVSF, severe MR, AS, mild TR. Aortic stenosis. S/p S/P BAV with Impella 17. TAVR 17- repeat ECHO 2017 EF 25-30%, mod MR,  bioprosthetic valve. On ASA. (off of plavix)       Atrial fib. EKG 5/10/17 Atrial fib with CVR, PVC, LVH, NS ST changes. On coumadin. DEMARCUS Cr 1.8 2017       Plan:      Mild orthostatic changes noted today in office. In view of recent fall, will stop plavix and continue ASA and coumadin. Recommend DECLAN hose.  Will stop lisinopril and reduce lasix to 40mg daily. Will also reduce Toprol to 50mg bid. No clinical evidence of CHF. No angina. Atrial fib controlled. Fasting lipid profile, CMP, proBNP prior to next visit.

## 2017-11-27 NOTE — PATIENT INSTRUCTIONS
foods  · Buy foods that are labeled \"unsalted\" (no salt added), \"sodium-free\" (less than 5 mg of sodium per serving), or \"low-sodium\" (less than 140 mg of sodium per serving). A food labeled \"light sodium\" has less than half of the full-sodium version of that food. Foods labeled \"reduced-sodium\" may still have too much sodium. · Buy fresh vegetables or plain, frozen vegetables. Buy low-sodium versions of canned vegetables, soups, and other canned goods. Prepare low-sodium meals  · Use less salt each day when cooking. Reducing salt in this way will help you adjust to the taste. Do not add salt after cooking. Take the salt shaker off the table. · Flavor your food with garlic, lemon juice, onion, vinegar, herbs, and spices instead of salt. Do not use soy sauce, steak sauce, onion salt, garlic salt, mustard, or ketchup on your food. · Make your own salad dressings, sauces, and ketchup without adding salt. · Use less salt (or none) when recipes call for it. You can often use half the salt a recipe calls for without losing flavor. Other dishes like rice, pasta, and grains do not need added salt. · Rinse canned vegetables. This removes somebut not allof the salt. · Avoid water that has a naturally high sodium content or that has been treated with water softeners, which add sodium. Call your local water company to find out the sodium content of your water supply. If you buy bottled water, read the label and choose a sodium-free brand. Avoid high-sodium foods, such as:  · Smoked, cured, salted, and canned meat, fish, and poultry. · Ham, eagle, hot dogs, and luncheon meats. · Regular, hard, and processed cheese and regular peanut butter. · Crackers with salted tops. · Frozen prepared meals. · Canned and dried soups, broths, and bouillon, unless labeled sodium-free or low-sodium. · Canned vegetables, unless labeled sodium-free or low-sodium. · Salted snack foods such as chips and pretzels.   · Western Francine fries, pizza, tacos, and other fast foods. · Pickles, olives, ketchup, and other condiments, especially soy sauce, unless labeled sodium-free or low-sodium. If you cannot cook for yourself  · Have family members or friends help you, or have someone cook low-sodium meals. · Check with your local senior nutrition program to find out where meals are served and whether they offer a low-sodium option. You can often find these programs through your local health department or hospital.  · Have meals delivered to your home. Most [facility] have a Meals on [employer]. These programs provide one hot meal a day for older adults, delivered to their homes. Ask whether these meals are low-sodium. Let them know that you are on a low-sodium diet. Limiting fluid intake  · Find a method that works for you. You might simply write down how much you drink every time you do. Some people keep a container filled with the amount of fluid allowed for that day. If they drink from a source other than the container, then they pour out that amount. · Measure your regular drinking glasses to find out how much fluid each one holds. Once you know this, you will not have to measure every time. · Besides water, milk, juices, and other drinks, some foods have a lot of fluid. Count any foods that will melt (such as ice cream or gelatin dessert) or liquid foods (such as soup) as part of your fluid intake for the day. Where can you learn more? Go to https://[employer]rosalee.INTTRA. org and sign in to your [employer] account. Enter A166 in the [facility] box to learn more about \"Limiting Sodium and Fluids With Heart Failure: Care Instructions. \"     If you do not have an account, please click on the \"Sign Up Now\" link. Current as of: November 15, 2016  Content Version: 11.3  © 6914-5432 [employer]. Care instructions adapted under license by [facility] ([facility]).  If you have questions about a medical condition or this instruction, always ask your healthcare professional. [place] [address] any warranty or liability for your use of this information. Patient Education      STOP PLAVIX  STOP LISINOPRIL  REDUCE LASIX TO 40MG DAILY  REDUCE TOPROL TO 50MG BID.

## 2017-11-30 NOTE — COMMUNICATION BODY
CELSO Julien is here at the request of the [facility] for evaluation of hypotension and falls. He was seen in the ED 11/20/17 after falling. His BB dosage was reduced. Today he denies chest pain, palpitations, syncope, worsening leg swelling and worsening dyspnea. He is participating in cardiac rehab. Assessment:       CAD (coronary artery disease)      s/p prox RCA BMS 08, severe prox Diag 1, normal LVEF. Cath 5/2017 50% LAD, 80%  Diagonal, 40% LCx, 70% mid PDA, patent RCA stent, LVEF 30-35%, moderate-severe MR, severe AS.  Diabetes      managed by PCP    Hyperlipidemia      LDL 61    HTN (hypertension)      Controlled. BP at [facility] 93/69. BB dose previously reduced        Dizziness likely secondary to medications. Mild orthostatic changes noted. Cardiomyopathy- Echo 5/2017 LVEF 25-30%, mod-severely reduced RVSF, severe MR, AS, mild TR. Aortic stenosis. S/p S/P BAV with Impella 6/8/17. TAVR 8/1/17- repeat ECHO 9/2017 EF 25-30%, mod MR,  bioprosthetic valve. On ASA. (off of plavix)       Atrial fib. EKG 5/10/17 Atrial fib with CVR, PVC, LVH, NS ST changes. On coumadin. DEMARCUS Cr 1.8 11/2017       Plan:      Mild orthostatic changes noted today in office. In view of recent fall, will stop plavix and continue ASA and coumadin. Recommend DECLAN hose. Will stop lisinopril and reduce lasix to 40mg daily. Will also reduce Toprol to 50mg bid. No clinical evidence of CHF. No angina. Atrial fib controlled. Fasting lipid profile, CMP, proBNP prior to next visit.

## 2018-01-08 RX ORDER — METOPROLOL TARTRATE 50 MG/1
50 TABLET, FILM COATED ORAL 2 TIMES DAILY
Qty: 180 TABLET | Refills: 3 | Status: SHIPPED | OUTPATIENT
Start: 2018-01-08 | End: 2019-01-07 | Stop reason: SDUPTHER

## 2018-02-13 ENCOUNTER — TELEPHONE (OUTPATIENT)
Dept: CARDIOLOGY CLINIC | Age: 83
End: 2018-02-13

## 2018-02-13 NOTE — TELEPHONE ENCOUNTER
CARDIAC CLEARANCE REQUEST  What type of procedure are you having: Carpal tunnel release surgery    Are you taking any blood thinners: yes warfarin. Patient needs to hold 1 week prior to surgery. Does patient need bridged?     When is your procedure scheduled for: 3/12/2018    What physician is performing your procedure: Dr Jennifer Justice    Phone and Fax number to the office so we can send the information over:

## 2018-02-16 RX ORDER — FUROSEMIDE 40 MG/1
TABLET ORAL
Qty: 90 TABLET | Refills: 2 | Status: SHIPPED | OUTPATIENT
Start: 2018-02-16 | End: 2018-03-21 | Stop reason: SDUPTHER

## 2018-03-12 RX ORDER — HYDROGEN PEROXIDE 2.65 ML/100ML
LIQUID ORAL; TOPICAL
Qty: 90 TABLET | Refills: 2 | Status: SHIPPED | OUTPATIENT
Start: 2018-03-12 | End: 2018-11-30 | Stop reason: SDUPTHER

## 2018-03-21 ENCOUNTER — OFFICE VISIT (OUTPATIENT)
Dept: CARDIOLOGY CLINIC | Age: 83
End: 2018-03-21

## 2018-03-21 VITALS
HEART RATE: 97 BPM | WEIGHT: 207.2 LBS | OXYGEN SATURATION: 90 % | BODY MASS INDEX: 28.06 KG/M2 | HEIGHT: 72 IN | DIASTOLIC BLOOD PRESSURE: 75 MMHG | SYSTOLIC BLOOD PRESSURE: 130 MMHG

## 2018-03-21 DIAGNOSIS — Z95.2 S/P TAVR (TRANSCATHETER AORTIC VALVE REPLACEMENT): ICD-10-CM

## 2018-03-21 DIAGNOSIS — E78.00 PURE HYPERCHOLESTEROLEMIA: ICD-10-CM

## 2018-03-21 DIAGNOSIS — I25.5 ISCHEMIC CARDIOMYOPATHY: ICD-10-CM

## 2018-03-21 DIAGNOSIS — I10 ESSENTIAL HYPERTENSION: ICD-10-CM

## 2018-03-21 DIAGNOSIS — I50.22 CHRONIC SYSTOLIC CONGESTIVE HEART FAILURE (HCC): Primary | ICD-10-CM

## 2018-03-21 PROBLEM — I35.0 NONRHEUMATIC AORTIC VALVE STENOSIS: Status: ACTIVE | Noted: 2018-03-21

## 2018-03-21 PROCEDURE — 4040F PNEUMOC VAC/ADMIN/RCVD: CPT | Performed by: INTERNAL MEDICINE

## 2018-03-21 PROCEDURE — G8417 CALC BMI ABV UP PARAM F/U: HCPCS | Performed by: INTERNAL MEDICINE

## 2018-03-21 PROCEDURE — 99214 OFFICE O/P EST MOD 30 MIN: CPT | Performed by: INTERNAL MEDICINE

## 2018-03-21 PROCEDURE — G8427 DOCREV CUR MEDS BY ELIG CLIN: HCPCS | Performed by: INTERNAL MEDICINE

## 2018-03-21 PROCEDURE — G8598 ASA/ANTIPLAT THER USED: HCPCS | Performed by: INTERNAL MEDICINE

## 2018-03-21 PROCEDURE — G8484 FLU IMMUNIZE NO ADMIN: HCPCS | Performed by: INTERNAL MEDICINE

## 2018-03-21 PROCEDURE — 1036F TOBACCO NON-USER: CPT | Performed by: INTERNAL MEDICINE

## 2018-03-21 PROCEDURE — 1123F ACP DISCUSS/DSCN MKR DOCD: CPT | Performed by: INTERNAL MEDICINE

## 2018-03-21 ASSESSMENT — ENCOUNTER SYMPTOMS
COUGH: 0
ABDOMINAL DISTENTION: 0
WHEEZING: 0
SHORTNESS OF BREATH: 0
EYE REDNESS: 0
BLOOD IN STOOL: 0

## 2018-03-21 NOTE — PROGRESS NOTES
Subjective:      Patient ID: Trent Silveira is a 80 y.o. male. Reason for visit: f/u CAD  CC: \"I am doing OK except for my cold. \"    HPI Trent Silveira denies chest pain, palpitations, dizziness, syncope, leg swelling and worsening chronic LANE. He states that he has a cold and his carpal tunnel surgery was postponed. He states that he has a cough and wheezing but denies fever and chills. Overall, he is feeling better after his TAVR. He states that he graduated from cardiac rehab. Review of Systems   Constitutional: Negative for activity change, appetite change, chills, fatigue, fever and unexpected weight change. HENT: Negative for congestion, nosebleeds and tinnitus. Eyes: Negative for redness and visual disturbance. Respiratory: Negative for cough, shortness of breath and wheezing. Cardiovascular: Negative for chest pain, palpitations and leg swelling. Gastrointestinal: Negative for abdominal distention and blood in stool. Genitourinary: Negative for dysuria and hematuria. Musculoskeletal: Negative for gait problem and myalgias. Neurological: Negative for dizziness and speech difficulty. Hematological: Does not bruise/bleed easily. Psychiatric/Behavioral: Negative for behavioral problems and confusion. All other systems reviewed and are negative. Objective:   Physical Exam   Constitutional: He is oriented to person, place, and time. He appears well-developed and well-nourished. HENT:   Head: Normocephalic and atraumatic. Eyes: Conjunctivae and EOM are normal.   Neck: Normal range of motion. Neck supple. Cardiovascular: Normal rate, S1 normal and S2 normal.  Exam reveals no gallop. Murmur (2/6 systolic) heard. irregular   Pulmonary/Chest: Effort normal.   Few basilar crackles. Abdominal: Soft. Bowel sounds are normal.   Musculoskeletal: Normal range of motion. He exhibits edema (mild BLE). Neurological: He is alert and oriented to person, place, and time.    Skin: Skin is warm and dry. Psychiatric: He has a normal mood and affect. His behavior is normal.   Nursing note and vitals reviewed. Blood pressure 130/75, pulse 97, height 6' (1.829 m), weight 207 lb 3.2 oz (94 kg), SpO2 90 %. Vitals:    03/21/18 1341   BP: 130/75   Site: Left Arm   Position: Sitting   Cuff Size: Medium Adult   Pulse: 97   SpO2: 90%   Weight: 207 lb 3.2 oz (94 kg)   Height: 6' (1.829 m)     Body mass index is 28.1 kg/m². Wt Readings from Last 3 Encounters:   03/21/18 207 lb 3.2 oz (94 kg)   11/27/17 203 lb (92.1 kg)   09/19/17 208 lb (94.3 kg)     BP Readings from Last 3 Encounters:   03/21/18 130/75   11/27/17 110/60   09/19/17 122/70        Current Outpatient Prescriptions   Medication Sig Dispense Refill    EQ ASPIRIN ADULT LOW DOSE 81 MG EC tablet TAKE ONE TABLET BY MOUTH ONCE DAILY 90 tablet 2    metoprolol tartrate (LOPRESSOR) 50 MG tablet Take 1 tablet by mouth 2 times daily 180 tablet 3    furosemide (LASIX) 40 MG tablet Take 1 tablet by mouth daily 90 tablet 2    metFORMIN (GLUCOPHAGE) 1000 MG tablet Take 1 tablet by mouth 2 times daily (with meals) 60 tablet 3    warfarin (COUMADIN) 5 MG tablet Take 1 tablet by mouth daily (Patient taking differently: Take 5 mg by mouth daily As directed by coumadin clinic) 30 tablet 3    tamsulosin (FLOMAX) 0.4 MG capsule Take 0.4 mg by mouth daily      Potassium Gluconate 550 MG TABS Take 99 mg by mouth daily       simvastatin (ZOCOR) 40 MG tablet Take 40 mg by mouth nightly.  allopurinol (ZYLOPRIM) 100 MG tablet Take 100 mg by mouth daily. No current facility-administered medications for this visit. Social History     Social History    Marital status:       Spouse name: N/A    Number of children: N/A    Years of education: N/A     Social History Main Topics    Smoking status: Former Smoker     Packs/day: 1.00     Years: 17.00     Start date: 1/1/1952     Quit date: 1/1/1969    Smokeless tobacco: Never Used

## 2018-06-04 RX ORDER — MIDODRINE HYDROCHLORIDE 5 MG/1
5 TABLET ORAL 3 TIMES DAILY
COMMUNITY
End: 2018-06-04 | Stop reason: SDUPTHER

## 2018-06-05 RX ORDER — MIDODRINE HYDROCHLORIDE 5 MG/1
5 TABLET ORAL 3 TIMES DAILY
Qty: 90 TABLET | Refills: 3 | Status: SHIPPED | OUTPATIENT
Start: 2018-06-05 | End: 2018-06-13

## 2018-06-13 ENCOUNTER — OFFICE VISIT (OUTPATIENT)
Dept: CARDIOLOGY CLINIC | Age: 83
End: 2018-06-13

## 2018-06-13 VITALS
HEIGHT: 72 IN | OXYGEN SATURATION: 93 % | DIASTOLIC BLOOD PRESSURE: 70 MMHG | SYSTOLIC BLOOD PRESSURE: 118 MMHG | HEART RATE: 68 BPM | BODY MASS INDEX: 27.09 KG/M2 | WEIGHT: 200 LBS

## 2018-06-13 DIAGNOSIS — I48.19 PERSISTENT ATRIAL FIBRILLATION (HCC): ICD-10-CM

## 2018-06-13 DIAGNOSIS — E78.2 MIXED HYPERLIPIDEMIA: ICD-10-CM

## 2018-06-13 DIAGNOSIS — I10 ESSENTIAL HYPERTENSION: ICD-10-CM

## 2018-06-13 DIAGNOSIS — I50.22 CHRONIC SYSTOLIC CONGESTIVE HEART FAILURE (HCC): Primary | ICD-10-CM

## 2018-06-13 DIAGNOSIS — I25.10 CORONARY ARTERY DISEASE INVOLVING NATIVE CORONARY ARTERY OF NATIVE HEART WITHOUT ANGINA PECTORIS: ICD-10-CM

## 2018-06-13 PROCEDURE — G8427 DOCREV CUR MEDS BY ELIG CLIN: HCPCS | Performed by: INTERNAL MEDICINE

## 2018-06-13 PROCEDURE — 99214 OFFICE O/P EST MOD 30 MIN: CPT | Performed by: INTERNAL MEDICINE

## 2018-06-13 PROCEDURE — G8598 ASA/ANTIPLAT THER USED: HCPCS | Performed by: INTERNAL MEDICINE

## 2018-06-13 PROCEDURE — G8417 CALC BMI ABV UP PARAM F/U: HCPCS | Performed by: INTERNAL MEDICINE

## 2018-06-13 PROCEDURE — 1036F TOBACCO NON-USER: CPT | Performed by: INTERNAL MEDICINE

## 2018-06-13 PROCEDURE — 4040F PNEUMOC VAC/ADMIN/RCVD: CPT | Performed by: INTERNAL MEDICINE

## 2018-06-13 PROCEDURE — 1123F ACP DISCUSS/DSCN MKR DOCD: CPT | Performed by: INTERNAL MEDICINE

## 2018-06-13 PROCEDURE — 93000 ELECTROCARDIOGRAM COMPLETE: CPT | Performed by: INTERNAL MEDICINE

## 2018-06-13 ASSESSMENT — ENCOUNTER SYMPTOMS
COUGH: 0
ABDOMINAL DISTENTION: 0
SHORTNESS OF BREATH: 1
EYE REDNESS: 0
WHEEZING: 0
BLOOD IN STOOL: 0

## 2018-07-09 ENCOUNTER — TELEPHONE (OUTPATIENT)
Dept: CARDIOLOGY CLINIC | Age: 83
End: 2018-07-09

## 2018-07-09 RX ORDER — MIDODRINE HYDROCHLORIDE 5 MG/1
5 TABLET ORAL 2 TIMES DAILY
COMMUNITY
End: 2019-02-07 | Stop reason: SDUPTHER

## 2018-07-09 NOTE — TELEPHONE ENCOUNTER
At the last OV Dr. Lexx Chu decreased patient's Midodrine due to it being cost prohibitive. Patient has now discovered that the [place] will provide the medication to the patient. He has since then taken it BID and would like to know if that is okay or should he take it TID as perviously prescribed.

## 2018-11-30 RX ORDER — ASPIRIN 81 MG/1
TABLET ORAL
Qty: 90 TABLET | Refills: 2 | Status: SHIPPED | OUTPATIENT
Start: 2018-11-30

## 2019-01-07 RX ORDER — METOPROLOL TARTRATE 50 MG/1
50 TABLET, FILM COATED ORAL 2 TIMES DAILY
Qty: 180 TABLET | Refills: 3 | Status: SHIPPED | OUTPATIENT
Start: 2019-01-07 | End: 2019-02-07 | Stop reason: DRUGHIGH

## 2019-01-29 ASSESSMENT — ENCOUNTER SYMPTOMS
ABDOMINAL DISTENTION: 0
BLOOD IN STOOL: 0
WHEEZING: 0
COUGH: 0
EYE REDNESS: 0

## 2019-02-05 ENCOUNTER — TELEPHONE (OUTPATIENT)
Dept: CARDIOLOGY CLINIC | Age: 84
End: 2019-02-05

## 2019-02-07 ENCOUNTER — OFFICE VISIT (OUTPATIENT)
Dept: CARDIOLOGY CLINIC | Age: 84
End: 2019-02-07
Payer: MEDICARE

## 2019-02-07 VITALS
WEIGHT: 202 LBS | HEIGHT: 72 IN | SYSTOLIC BLOOD PRESSURE: 80 MMHG | BODY MASS INDEX: 27.36 KG/M2 | OXYGEN SATURATION: 95 % | DIASTOLIC BLOOD PRESSURE: 50 MMHG | HEART RATE: 95 BPM

## 2019-02-07 DIAGNOSIS — E78.00 PURE HYPERCHOLESTEROLEMIA: ICD-10-CM

## 2019-02-07 DIAGNOSIS — I25.10 CORONARY ARTERY DISEASE INVOLVING NATIVE CORONARY ARTERY OF NATIVE HEART WITHOUT ANGINA PECTORIS: Primary | ICD-10-CM

## 2019-02-07 DIAGNOSIS — I35.0 NONRHEUMATIC AORTIC VALVE STENOSIS: ICD-10-CM

## 2019-02-07 DIAGNOSIS — I10 ESSENTIAL HYPERTENSION: ICD-10-CM

## 2019-02-07 DIAGNOSIS — I50.22 CHRONIC SYSTOLIC CONGESTIVE HEART FAILURE (HCC): ICD-10-CM

## 2019-02-07 DIAGNOSIS — I25.5 ISCHEMIC CARDIOMYOPATHY: ICD-10-CM

## 2019-02-07 PROCEDURE — G8484 FLU IMMUNIZE NO ADMIN: HCPCS | Performed by: INTERNAL MEDICINE

## 2019-02-07 PROCEDURE — G8417 CALC BMI ABV UP PARAM F/U: HCPCS | Performed by: INTERNAL MEDICINE

## 2019-02-07 PROCEDURE — 99214 OFFICE O/P EST MOD 30 MIN: CPT | Performed by: INTERNAL MEDICINE

## 2019-02-07 PROCEDURE — 1036F TOBACCO NON-USER: CPT | Performed by: INTERNAL MEDICINE

## 2019-02-07 PROCEDURE — 1101F PT FALLS ASSESS-DOCD LE1/YR: CPT | Performed by: INTERNAL MEDICINE

## 2019-02-07 PROCEDURE — G8598 ASA/ANTIPLAT THER USED: HCPCS | Performed by: INTERNAL MEDICINE

## 2019-02-07 PROCEDURE — G8427 DOCREV CUR MEDS BY ELIG CLIN: HCPCS | Performed by: INTERNAL MEDICINE

## 2019-02-07 PROCEDURE — 4040F PNEUMOC VAC/ADMIN/RCVD: CPT | Performed by: INTERNAL MEDICINE

## 2019-02-07 PROCEDURE — 1123F ACP DISCUSS/DSCN MKR DOCD: CPT | Performed by: INTERNAL MEDICINE

## 2019-02-07 RX ORDER — MIDODRINE HYDROCHLORIDE 5 MG/1
5 TABLET ORAL 3 TIMES DAILY
Qty: 90 TABLET | Refills: 3 | Status: SHIPPED | OUTPATIENT
Start: 2019-02-07 | End: 2019-08-08 | Stop reason: DRUGHIGH

## 2019-02-07 RX ORDER — SPIRONOLACTONE 25 MG/1
25 TABLET ORAL DAILY
Status: ON HOLD | COMMUNITY
End: 2019-09-11

## 2019-02-07 ASSESSMENT — ENCOUNTER SYMPTOMS: SHORTNESS OF BREATH: 1

## 2019-07-01 ENCOUNTER — TELEPHONE (OUTPATIENT)
Dept: CARDIOLOGY CLINIC | Age: 84
End: 2019-07-01

## 2019-07-01 DIAGNOSIS — R06.02 SOB (SHORTNESS OF BREATH): ICD-10-CM

## 2019-07-01 DIAGNOSIS — R60.0 LEG EDEMA: ICD-10-CM

## 2019-07-01 DIAGNOSIS — I50.22 CHRONIC SYSTOLIC CONGESTIVE HEART FAILURE (HCC): Primary | ICD-10-CM

## 2019-07-03 RX ORDER — FUROSEMIDE 40 MG/1
40 TABLET ORAL 2 TIMES DAILY
COMMUNITY
End: 2019-07-03 | Stop reason: SDUPTHER

## 2019-07-03 RX ORDER — FUROSEMIDE 40 MG/1
40 TABLET ORAL DAILY
Qty: 90 TABLET | Refills: 3 | Status: ON HOLD | OUTPATIENT
Start: 2019-07-03 | End: 2019-09-11

## 2019-08-05 ENCOUNTER — TELEPHONE (OUTPATIENT)
Dept: CARDIOLOGY CLINIC | Age: 84
End: 2019-08-05

## 2019-08-08 ENCOUNTER — OFFICE VISIT (OUTPATIENT)
Dept: CARDIOLOGY CLINIC | Age: 84
End: 2019-08-08
Payer: MEDICARE

## 2019-08-08 VITALS
SYSTOLIC BLOOD PRESSURE: 80 MMHG | WEIGHT: 212.2 LBS | OXYGEN SATURATION: 96 % | HEART RATE: 70 BPM | DIASTOLIC BLOOD PRESSURE: 40 MMHG | BODY MASS INDEX: 28.74 KG/M2 | HEIGHT: 72 IN

## 2019-08-08 DIAGNOSIS — E78.00 PURE HYPERCHOLESTEROLEMIA: ICD-10-CM

## 2019-08-08 DIAGNOSIS — Z79.899 HIGH RISK MEDICATION USE: ICD-10-CM

## 2019-08-08 DIAGNOSIS — I50.22 CHRONIC SYSTOLIC CONGESTIVE HEART FAILURE (HCC): ICD-10-CM

## 2019-08-08 DIAGNOSIS — I25.10 CORONARY ARTERY DISEASE INVOLVING NATIVE CORONARY ARTERY OF NATIVE HEART WITHOUT ANGINA PECTORIS: Primary | ICD-10-CM

## 2019-08-08 PROCEDURE — 4040F PNEUMOC VAC/ADMIN/RCVD: CPT | Performed by: INTERNAL MEDICINE

## 2019-08-08 PROCEDURE — 99214 OFFICE O/P EST MOD 30 MIN: CPT | Performed by: INTERNAL MEDICINE

## 2019-08-08 PROCEDURE — 1036F TOBACCO NON-USER: CPT | Performed by: INTERNAL MEDICINE

## 2019-08-08 PROCEDURE — G8427 DOCREV CUR MEDS BY ELIG CLIN: HCPCS | Performed by: INTERNAL MEDICINE

## 2019-08-08 PROCEDURE — G8598 ASA/ANTIPLAT THER USED: HCPCS | Performed by: INTERNAL MEDICINE

## 2019-08-08 PROCEDURE — G8417 CALC BMI ABV UP PARAM F/U: HCPCS | Performed by: INTERNAL MEDICINE

## 2019-08-08 PROCEDURE — 1123F ACP DISCUSS/DSCN MKR DOCD: CPT | Performed by: INTERNAL MEDICINE

## 2019-08-08 RX ORDER — GLIPIZIDE 5 MG/1
2.5 TABLET ORAL DAILY
Status: ON HOLD | COMMUNITY
End: 2019-09-11

## 2019-08-08 RX ORDER — ACYCLOVIR 200 MG/1
CAPSULE ORAL 2 TIMES DAILY
COMMUNITY

## 2019-08-08 RX ORDER — MIDODRINE HYDROCHLORIDE 5 MG/1
5 TABLET ORAL 3 TIMES DAILY
COMMUNITY

## 2019-08-08 RX ORDER — DIGOXIN 125 MCG
125 TABLET ORAL DAILY
COMMUNITY
End: 2019-08-08 | Stop reason: SDUPTHER

## 2019-08-08 ASSESSMENT — ENCOUNTER SYMPTOMS
BLOOD IN STOOL: 0
EYE REDNESS: 0
COUGH: 0
SHORTNESS OF BREATH: 1
WHEEZING: 0
ABDOMINAL DISTENTION: 0

## 2019-08-08 NOTE — PATIENT INSTRUCTIONS
Patient Education         Heart Failure: Limiting Sodium (01:32)  Your health professional recommends that you watch this short online health video. Learn why sodium is bad for heart failure and get tips for low-sodium meals. Patient Education      REDUCE EVENING DOSE OF LOPRESSOR TO 12.5MG NIGHTLY (1/2 TABLET)  Limiting Sodium and Fluids With Heart Failure: Care Instructions  Your Care Instructions    Sodium causes your body to hold on to extra water. This may cause your heart failure symptoms to get worse. Limiting sodium may help you feel better and lower your risk of having to go to the hospital.  People get most of their sodium from processed foods. Fast food and restaurant meals also tend to be very high in sodium. Your doctor may suggest that you limit sodium to 2,000 milligrams (mg) a day or less. That is less than 1 teaspoon of salt a day, including all the salt you eat in cooked or packaged foods. Usually, you have to limit the amount of liquids you drink only if your heart failure is severe. Limiting sodium alone often is enough to help your body get rid of extra fluids. However, your doctor may tell you to limit your fluid intake to a set amount each day. Follow-up care is a key part of your treatment and safety. Be sure to make and go to all appointments, and call your doctor if you are having problems. It's also a good idea to know your test results and keep a list of the medicines you take. How can you care for yourself at home? Read food labels  · Read food labels on cans and food packages. The labels tell you how much sodium is in each serving. Make sure that you look at the serving size. If you eat more than the serving size, you have eaten more sodium than is listed for one serving. · Food labels also tell you the Percent Daily Value. If the Percent Daily Value says 50%, it means that you will get at least 50% of all the sodium you need for the entire day in one serving.  Choose and regular peanut butter. · Crackers with salted tops. · Frozen prepared meals. · Canned and dried soups, broths, and bouillon, unless labeled sodium-free or low-sodium. · Canned vegetables, unless labeled sodium-free or low-sodium. · Salted snack foods such as chips and pretzels. · Western Francine fries, pizza, tacos, and other fast foods. · Pickles, olives, ketchup, and other condiments, especially soy sauce, unless labeled sodium-free or low-sodium. If you cannot cook for yourself  · Have family members or friends help you, or have someone cook low-sodium meals. · Check with your local senior nutrition program to find out where meals are served and whether they offer a low-sodium option. You can often find these programs through your local health department or hospital.  · Have meals delivered to your home. Most [facility] have a Meals on [employer]. These programs provide one hot meal a day for older adults, delivered to their homes. Ask whether these meals are low-sodium. Let them know that you are on a low-sodium diet. Limiting fluid intake  · Find a method that works for you. You might simply write down how much you drink every time you do. Some people keep a container filled with the amount of fluid allowed for that day. If they drink from a source other than the container, then they pour out that amount. · Measure your regular drinking glasses to find out how much fluid each one holds. Once you know this, you will not have to measure every time. · Besides water, milk, juices, and other drinks, some foods have a lot of fluid. Count any foods that will melt (such as ice cream or gelatin dessert) or liquid foods (such as soup) as part of your fluid intake for the day. Where can you learn more? Go to https://cheldoneb.healthPeak Positioning Technologies. org and sign in to your [employer] account.  Enter A166 in the [facility] box to learn more about \"Limiting Sodium and Fluids With Heart Failure: Care Instructions. \"     If you do not have an account, please click on the \"Sign Up Now\" link. Current as of: July 22, 2018  Content Version: 12.1  © 2006-2019 [employer]. Care instructions adapted under license by [address] If you have questions about a medical condition or this instruction, always ask your healthcare professional. [employer] any warranty or liability for your use of this information. Patient Education         Heart Failure: Limiting Sodium (01:32)  Your health professional recommends that you watch this short online health video. Learn why sodium is bad for heart failure and get tips for low-sodium meals. How to watch the video    Scan the QR code   OR Visit the website    https://hwi. se/r/Ewekwadxgzdxn   Current as of: July 22, 2018  Content Version: 12.1  © 2006-2019 [employer]. Care instructions adapted under license by [address] If you have questions about a medical condition or this instruction, always ask your healthcare professional. [employer] any warranty or liability for your use of this information. Patient Education        Limiting Sodium and Fluids With Heart Failure: Care Instructions  Your Care Instructions    Sodium causes your body to hold on to extra water. This may cause your heart failure symptoms to get worse. Limiting sodium may help you feel better and lower your risk of having to go to the hospital.  People get most of their sodium from processed foods. Fast food and restaurant meals also tend to be very high in sodium. Your doctor may suggest that you limit sodium to 2,000 milligrams (mg) a day or less. That is less than 1 teaspoon of salt a day, including all the salt you eat in cooked or packaged foods. Usually, you have to limit the amount of liquids you drink only if your heart failure is severe.  Limiting sodium alone often is enough to help your body get rid of extra fluids. However, your doctor may tell you to limit your fluid intake to a set amount each day. Follow-up care is a key part of your treatment and safety. Be sure to make and go to all appointments, and call your doctor if you are having problems. It's also a good idea to know your test results and keep a list of the medicines you take. How can you care for yourself at home? Read food labels  · Read food labels on cans and food packages. The labels tell you how much sodium is in each serving. Make sure that you look at the serving size. If you eat more than the serving size, you have eaten more sodium than is listed for one serving. · Food labels also tell you the Percent Daily Value. If the Percent Daily Value says 50%, it means that you will get at least 50% of all the sodium you need for the entire day in one serving. Choose products with low Percent Daily Values for sodium. · Be aware that sodium can come in forms other than salt, including monosodium glutamate (MSG), sodium citrate, and sodium bicarbonate (baking soda). MSG is often added to Asian food. You can sometimes ask for food without MSG or salt. Buy low-sodium foods  · Buy foods that are labeled \"unsalted\" (no salt added), \"sodium-free\" (less than 5 mg of sodium per serving), or \"low-sodium\" (less than 140 mg of sodium per serving). A food labeled \"light sodium\" has less than half of the full-sodium version of that food. Foods labeled \"reduced-sodium\" may still have too much sodium. · Buy fresh vegetables or plain, frozen vegetables. Buy low-sodium versions of canned vegetables, soups, and other canned goods. Prepare low-sodium meals  · Use less salt each day when cooking. Reducing salt in this way will help you adjust to the taste. Do not add salt after cooking. Take the salt shaker off the table. · Flavor your food with garlic, lemon juice, onion, vinegar, herbs, and spices instead of salt.  Do not use soy sauce, steak sauce, onion salt, garlic salt, mustard, or ketchup on your food. · Make your own salad dressings, sauces, and ketchup without adding salt. · Use less salt (or none) when recipes call for it. You can often use half the salt a recipe calls for without losing flavor. Other dishes like rice, pasta, and grains do not need added salt. · Rinse canned vegetables. This removes some--but not all--of the salt. · Avoid water that has a naturally high sodium content or that has been treated with water softeners, which add sodium. Call your local water company to find out the sodium content of your water supply. If you buy bottled water, read the label and choose a sodium-free brand. Avoid high-sodium foods, such as:  · Smoked, cured, salted, and canned meat, fish, and poultry. · Ham, eagle, hot dogs, and luncheon meats. · Regular, hard, and processed cheese and regular peanut butter. · Crackers with salted tops. · Frozen prepared meals. · Canned and dried soups, broths, and bouillon, unless labeled sodium-free or low-sodium. · Canned vegetables, unless labeled sodium-free or low-sodium. · Salted snack foods such as chips and pretzels. · Western Francine fries, pizza, tacos, and other fast foods. · Pickles, olives, ketchup, and other condiments, especially soy sauce, unless labeled sodium-free or low-sodium. If you cannot cook for yourself  · Have family members or friends help you, or have someone cook low-sodium meals. · Check with your local senior nutrition program to find out where meals are served and whether they offer a low-sodium option. You can often find these programs through your local health department or hospital.  · Have meals delivered to your home. Most [facility] have a [employer] on [employer]. These programs provide one hot meal a day for older adults, delivered to their homes. Ask whether these meals are low-sodium. Let them know that you are on a low-sodium diet. Limiting fluid intake  · Find a method that works for you. table.  · Flavor your food with garlic, lemon juice, onion, vinegar, herbs, and spices instead of salt. Do not use soy sauce, steak sauce, onion salt, garlic salt, mustard, or ketchup on your food. · Make your own salad dressings, sauces, and ketchup without adding salt. · Use less salt (or none) when recipes call for it. You can often use half the salt a recipe calls for without losing flavor. Other dishes like rice, pasta, and grains do not need added salt. · Rinse canned vegetables. This removes some--but not all--of the salt. · Avoid water that has a naturally high sodium content or that has been treated with water softeners, which add sodium. Call your local water company to find out the sodium content of your water supply. If you buy bottled water, read the label and choose a sodium-free brand. Avoid high-sodium foods, such as:  · Smoked, cured, salted, and canned meat, fish, and poultry. · Ham, eagle, hot dogs, and luncheon meats. · Regular, hard, and processed cheese and regular peanut butter. · Crackers with salted tops. · Frozen prepared meals. · Canned and dried soups, broths, and bouillon, unless labeled sodium-free or low-sodium. · Canned vegetables, unless labeled sodium-free or low-sodium. · Salted snack foods such as chips and pretzels. · Western Francine fries, pizza, tacos, and other fast foods. · Pickles, olives, ketchup, and other condiments, especially soy sauce, unless labeled sodium-free or low-sodium. If you cannot cook for yourself  · Have family members or friends help you, or have someone cook low-sodium meals. · Check with your local senior nutrition program to find out where meals are served and whether they offer a low-sodium option. You can often find these programs through your local health department or hospital.  · Have meals delivered to your home. Most [facility] have a Meals on [employer]. These programs provide one hot meal a day for older adults, delivered to their homes. you do not have an account, please click on the \"Sign Up Now\" link. Current as of: November 7, 2018  Content Version: 12.1  © 3192-8949 Healthwise, Incorporated. Care instructions adapted under license by [facility] ([facility]). If you have questions about a medical condition or this instruction, always ask your healthcare professional. [address] any warranty or liability for your use of this information.

## 2019-08-08 NOTE — LETTER
[address] Cardiology - [address] [address] [address]  [address] [address]  Phone: [phone]  Fax: [phone]    Laura Crouch MD        August 9, 2019     [address],   [address] [facility],[address] [address]    Patient: Sandra Thurman  MR Number: [id number]  YOB: 1932  Date of Visit: 8/8/2019    Dear Dr. Hamilton Brazil:    Thank you for your referral. Progress note attached in visit summary. If you have questions, please do not hesitate to call me. I look forward to following Ashleigh Kumar along with you.     Sincerely,        Laura Crouch MD

## 2019-08-09 RX ORDER — WARFARIN SODIUM 5 MG/1
5 TABLET ORAL DAILY
Qty: 90 TABLET | Refills: 3 | Status: ON HOLD | OUTPATIENT
Start: 2019-08-09 | End: 2019-09-11

## 2019-08-09 RX ORDER — DIGOXIN 125 MCG
125 TABLET ORAL DAILY
Qty: 90 TABLET | Refills: 3 | Status: SHIPPED | OUTPATIENT
Start: 2019-08-09

## 2019-08-09 RX ORDER — WARFARIN SODIUM 5 MG/1
5 TABLET ORAL DAILY
Qty: 90 TABLET | Refills: 3 | Status: SHIPPED | OUTPATIENT
Start: 2019-08-09

## 2019-09-11 ENCOUNTER — HOSPITAL ENCOUNTER (INPATIENT)
Age: 84
LOS: 8 days | Discharge: HOME HEALTH | DRG: 948 | End: 2019-09-19
Attending: PHYSICAL MEDICINE & REHABILITATION | Admitting: PHYSICAL MEDICINE & REHABILITATION
Payer: MEDICARE

## 2019-09-11 PROBLEM — M17.0 ARTHRITIS OF BOTH KNEES: Status: ACTIVE | Noted: 2019-09-11

## 2019-09-11 LAB
GLUCOSE BLD-MCNC: 158 MG/DL (ref 70–99)
GLUCOSE BLD-MCNC: 169 MG/DL (ref 70–99)
PERFORMED ON: ABNORMAL
PERFORMED ON: ABNORMAL

## 2019-09-11 PROCEDURE — 1280000000 HC REHAB R&B

## 2019-09-11 PROCEDURE — 6360000002 HC RX W HCPCS: Performed by: PHYSICAL MEDICINE & REHABILITATION

## 2019-09-11 PROCEDURE — 6370000000 HC RX 637 (ALT 250 FOR IP): Performed by: PHYSICAL MEDICINE & REHABILITATION

## 2019-09-11 RX ORDER — DEXTROSE MONOHYDRATE 25 G/50ML
12.5 INJECTION, SOLUTION INTRAVENOUS PRN
Status: DISCONTINUED | OUTPATIENT
Start: 2019-09-11 | End: 2019-09-19 | Stop reason: HOSPADM

## 2019-09-11 RX ORDER — ASPIRIN 81 MG/1
81 TABLET ORAL DAILY
Status: DISCONTINUED | OUTPATIENT
Start: 2019-09-12 | End: 2019-09-19 | Stop reason: HOSPADM

## 2019-09-11 RX ORDER — ONDANSETRON 4 MG/1
4 TABLET, FILM COATED ORAL EVERY 8 HOURS PRN
Status: DISCONTINUED | OUTPATIENT
Start: 2019-09-11 | End: 2019-09-19 | Stop reason: HOSPADM

## 2019-09-11 RX ORDER — DEXTROSE MONOHYDRATE 50 MG/ML
100 INJECTION, SOLUTION INTRAVENOUS PRN
Status: DISCONTINUED | OUTPATIENT
Start: 2019-09-11 | End: 2019-09-19 | Stop reason: HOSPADM

## 2019-09-11 RX ORDER — GLIPIZIDE 2.5 MG/1
2.5 TABLET, EXTENDED RELEASE ORAL DAILY
COMMUNITY

## 2019-09-11 RX ORDER — MIDODRINE HYDROCHLORIDE 5 MG/1
5 TABLET ORAL 3 TIMES DAILY
Status: DISCONTINUED | OUTPATIENT
Start: 2019-09-11 | End: 2019-09-19 | Stop reason: HOSPADM

## 2019-09-11 RX ORDER — SIMVASTATIN 40 MG
40 TABLET ORAL NIGHTLY
Status: DISCONTINUED | OUTPATIENT
Start: 2019-09-11 | End: 2019-09-11

## 2019-09-11 RX ORDER — SIMVASTATIN 20 MG
20 TABLET ORAL NIGHTLY
Status: DISCONTINUED | OUTPATIENT
Start: 2019-09-11 | End: 2019-09-19 | Stop reason: HOSPADM

## 2019-09-11 RX ORDER — DIGOXIN 125 MCG
125 TABLET ORAL DAILY
Status: DISCONTINUED | OUTPATIENT
Start: 2019-09-11 | End: 2019-09-19 | Stop reason: HOSPADM

## 2019-09-11 RX ORDER — POLYETHYLENE GLYCOL 3350 17 G/17G
17 POWDER, FOR SOLUTION ORAL DAILY
Status: DISCONTINUED | OUTPATIENT
Start: 2019-09-12 | End: 2019-09-19 | Stop reason: HOSPADM

## 2019-09-11 RX ORDER — ACETAMINOPHEN 325 MG/1
650 TABLET ORAL EVERY 4 HOURS PRN
Status: DISCONTINUED | OUTPATIENT
Start: 2019-09-11 | End: 2019-09-19 | Stop reason: HOSPADM

## 2019-09-11 RX ORDER — GLIPIZIDE 5 MG/1
2.5 TABLET ORAL
Status: DISCONTINUED | OUTPATIENT
Start: 2019-09-12 | End: 2019-09-19 | Stop reason: HOSPADM

## 2019-09-11 RX ORDER — ACYCLOVIR 200 MG/1
200 CAPSULE ORAL 2 TIMES DAILY
Status: DISCONTINUED | OUTPATIENT
Start: 2019-09-11 | End: 2019-09-19 | Stop reason: HOSPADM

## 2019-09-11 RX ORDER — SENNA AND DOCUSATE SODIUM 50; 8.6 MG/1; MG/1
2 TABLET, FILM COATED ORAL 2 TIMES DAILY
Status: DISCONTINUED | OUTPATIENT
Start: 2019-09-11 | End: 2019-09-19 | Stop reason: HOSPADM

## 2019-09-11 RX ORDER — WARFARIN SODIUM 5 MG/1
5 TABLET ORAL DAILY
Status: DISCONTINUED | OUTPATIENT
Start: 2019-09-11 | End: 2019-09-11

## 2019-09-11 RX ORDER — BISACODYL 10 MG
10 SUPPOSITORY, RECTAL RECTAL DAILY PRN
Status: DISCONTINUED | OUTPATIENT
Start: 2019-09-11 | End: 2019-09-19 | Stop reason: HOSPADM

## 2019-09-11 RX ORDER — NICOTINE POLACRILEX 4 MG
15 LOZENGE BUCCAL PRN
Status: DISCONTINUED | OUTPATIENT
Start: 2019-09-11 | End: 2019-09-19 | Stop reason: HOSPADM

## 2019-09-11 RX ORDER — DEXAMETHASONE 4 MG/1
20 TABLET ORAL
Status: DISCONTINUED | OUTPATIENT
Start: 2019-09-11 | End: 2019-09-12

## 2019-09-11 RX ORDER — WARFARIN SODIUM 5 MG/1
5 TABLET ORAL
Status: COMPLETED | OUTPATIENT
Start: 2019-09-11 | End: 2019-09-11

## 2019-09-11 RX ORDER — DEXAMETHASONE 4 MG/1
20 TABLET ORAL WEEKLY
Status: ON HOLD | COMMUNITY
End: 2019-09-18 | Stop reason: HOSPADM

## 2019-09-11 RX ORDER — ALLOPURINOL 100 MG/1
100 TABLET ORAL DAILY
Status: DISCONTINUED | OUTPATIENT
Start: 2019-09-12 | End: 2019-09-19 | Stop reason: HOSPADM

## 2019-09-11 RX ADMIN — SENNOSIDES, DOCUSATE SODIUM 2 TABLET: 50; 8.6 TABLET, FILM COATED ORAL at 21:46

## 2019-09-11 RX ADMIN — MIDODRINE HYDROCHLORIDE 5 MG: 5 TABLET ORAL at 17:47

## 2019-09-11 RX ADMIN — SIMVASTATIN 20 MG: 20 TABLET, FILM COATED ORAL at 21:46

## 2019-09-11 RX ADMIN — METFORMIN HYDROCHLORIDE 1000 MG: 500 TABLET ORAL at 16:53

## 2019-09-11 RX ADMIN — ACYCLOVIR 200 MG: 200 CAPSULE ORAL at 21:45

## 2019-09-11 RX ADMIN — METOPROLOL TARTRATE 12.5 MG: 25 TABLET ORAL at 21:46

## 2019-09-11 RX ADMIN — WARFARIN SODIUM 5 MG: 5 TABLET ORAL at 17:47

## 2019-09-11 RX ADMIN — DIGOXIN 125 MCG: 125 TABLET ORAL at 17:49

## 2019-09-11 ASSESSMENT — PAIN SCALES - GENERAL
PAINLEVEL_OUTOF10: 0

## 2019-09-11 NOTE — PROGRESS NOTES
4 Eyes Skin Assessment     The patient is being assess for   Admission    I agree that 2 RN's have performed a thorough Head to Toe Skin Assessment on the patient. ALL assessment sites listed below have been assessed. Areas assessed by both nurses:   [x]   Head, Face, and Ears   [x]   Shoulders, Back, and Chest, Abdomen  [x]   Arms, Elbows, and Hands   [x]   Coccyx, Sacrum, and Ischium  [x]   Legs, Feet, and Heels            **SHARE this note so that the co-signing nurse is able to place an eSignature**    Co-signer eSignature: Electronically signed by Dominik Mantilla RN on 9/11/19 at 3:33 PM    Does the Patient have Skin Breakdown?   No          Laith Prevention initiated:  No   Wound Care Orders initiated:  No      [facility] nurse consulted for Pressure Injury (Stage 3,4, Unstageable, DTI, NWPT, Complex wounds)and New or Established Ostomies:  No      Primary Nurse eSignature: Electronically signed by Heber Abreu RN on 9/11/19 at 3:31 PM

## 2019-09-12 LAB
ANION GAP SERPL CALCULATED.3IONS-SCNC: 11 MMOL/L (ref 3–16)
BUN BLDV-MCNC: 13 MG/DL (ref 7–20)
CALCIUM SERPL-MCNC: 7.9 MG/DL (ref 8.3–10.6)
CHLORIDE BLD-SCNC: 103 MMOL/L (ref 99–110)
CO2: 26 MMOL/L (ref 21–32)
CREAT SERPL-MCNC: 0.9 MG/DL (ref 0.8–1.3)
GFR AFRICAN AMERICAN: >60
GFR NON-AFRICAN AMERICAN: >60
GLUCOSE BLD-MCNC: 143 MG/DL (ref 70–99)
GLUCOSE BLD-MCNC: 148 MG/DL (ref 70–99)
GLUCOSE BLD-MCNC: 163 MG/DL (ref 70–99)
GLUCOSE BLD-MCNC: 170 MG/DL (ref 70–99)
GLUCOSE BLD-MCNC: 180 MG/DL (ref 70–99)
HCT VFR BLD CALC: 33.2 % (ref 40.5–52.5)
HEMOGLOBIN: 11.2 G/DL (ref 13.5–17.5)
INR BLD: 2.68 (ref 0.86–1.14)
MAGNESIUM: 1.7 MG/DL (ref 1.8–2.4)
MCH RBC QN AUTO: 34.1 PG (ref 26–34)
MCHC RBC AUTO-ENTMCNC: 33.8 G/DL (ref 31–36)
MCV RBC AUTO: 100.8 FL (ref 80–100)
PDW BLD-RTO: 15 % (ref 12.4–15.4)
PERFORMED ON: ABNORMAL
PLATELET # BLD: 176 K/UL (ref 135–450)
PMV BLD AUTO: 8.2 FL (ref 5–10.5)
POTASSIUM REFLEX MAGNESIUM: 4.2 MMOL/L (ref 3.5–5.1)
PROTHROMBIN TIME: 30.6 SEC (ref 9.8–13)
RBC # BLD: 3.3 M/UL (ref 4.2–5.9)
SODIUM BLD-SCNC: 140 MMOL/L (ref 136–145)
WBC # BLD: 6.2 K/UL (ref 4–11)

## 2019-09-12 PROCEDURE — 97162 PT EVAL MOD COMPLEX 30 MIN: CPT | Performed by: PHYSICAL THERAPIST

## 2019-09-12 PROCEDURE — 94760 N-INVAS EAR/PLS OXIMETRY 1: CPT

## 2019-09-12 PROCEDURE — 97530 THERAPEUTIC ACTIVITIES: CPT | Performed by: PHYSICAL THERAPIST

## 2019-09-12 PROCEDURE — 97535 SELF CARE MNGMENT TRAINING: CPT

## 2019-09-12 PROCEDURE — 6370000000 HC RX 637 (ALT 250 FOR IP): Performed by: PHYSICAL MEDICINE & REHABILITATION

## 2019-09-12 PROCEDURE — 85027 COMPLETE CBC AUTOMATED: CPT

## 2019-09-12 PROCEDURE — 97167 OT EVAL HIGH COMPLEX 60 MIN: CPT

## 2019-09-12 PROCEDURE — 80048 BASIC METABOLIC PNL TOTAL CA: CPT

## 2019-09-12 PROCEDURE — 97116 GAIT TRAINING THERAPY: CPT | Performed by: PHYSICAL THERAPIST

## 2019-09-12 PROCEDURE — 97110 THERAPEUTIC EXERCISES: CPT | Performed by: PHYSICAL THERAPIST

## 2019-09-12 PROCEDURE — 36415 COLL VENOUS BLD VENIPUNCTURE: CPT

## 2019-09-12 PROCEDURE — 1280000000 HC REHAB R&B

## 2019-09-12 PROCEDURE — 83735 ASSAY OF MAGNESIUM: CPT

## 2019-09-12 PROCEDURE — 85610 PROTHROMBIN TIME: CPT

## 2019-09-12 PROCEDURE — 97530 THERAPEUTIC ACTIVITIES: CPT

## 2019-09-12 RX ORDER — CALCIUM CARBONATE 500(1250)
500 TABLET ORAL 2 TIMES DAILY WITH MEALS
Status: DISCONTINUED | OUTPATIENT
Start: 2019-09-12 | End: 2019-09-19 | Stop reason: HOSPADM

## 2019-09-12 RX ORDER — LANOLIN ALCOHOL/MO/W.PET/CERES
400 CREAM (GRAM) TOPICAL 3 TIMES DAILY
Status: DISCONTINUED | OUTPATIENT
Start: 2019-09-12 | End: 2019-09-16

## 2019-09-12 RX ORDER — TAMSULOSIN HYDROCHLORIDE 0.4 MG/1
0.4 CAPSULE ORAL DAILY
Status: DISCONTINUED | OUTPATIENT
Start: 2019-09-12 | End: 2019-09-12

## 2019-09-12 RX ORDER — TAMSULOSIN HYDROCHLORIDE 0.4 MG/1
0.4 CAPSULE ORAL DAILY
Status: DISCONTINUED | OUTPATIENT
Start: 2019-09-13 | End: 2019-09-19 | Stop reason: HOSPADM

## 2019-09-12 RX ORDER — CELECOXIB 200 MG/1
200 CAPSULE ORAL DAILY
Status: DISCONTINUED | OUTPATIENT
Start: 2019-09-12 | End: 2019-09-19 | Stop reason: HOSPADM

## 2019-09-12 RX ADMIN — SIMVASTATIN 20 MG: 20 TABLET, FILM COATED ORAL at 21:43

## 2019-09-12 RX ADMIN — MIDODRINE HYDROCHLORIDE 5 MG: 5 TABLET ORAL at 12:58

## 2019-09-12 RX ADMIN — CALCIUM 500 MG: 500 TABLET ORAL at 17:57

## 2019-09-12 RX ADMIN — METOPROLOL TARTRATE 25 MG: 25 TABLET ORAL at 09:55

## 2019-09-12 RX ADMIN — ALLOPURINOL 100 MG: 100 TABLET ORAL at 09:59

## 2019-09-12 RX ADMIN — ACYCLOVIR 200 MG: 200 CAPSULE ORAL at 21:43

## 2019-09-12 RX ADMIN — METFORMIN HYDROCHLORIDE 1000 MG: 500 TABLET ORAL at 17:57

## 2019-09-12 RX ADMIN — CELECOXIB 200 MG: 200 CAPSULE ORAL at 14:51

## 2019-09-12 RX ADMIN — GLIPIZIDE 2.5 MG: 5 TABLET ORAL at 09:55

## 2019-09-12 RX ADMIN — Medication 400 MG: at 21:43

## 2019-09-12 RX ADMIN — Medication 400 MG: at 14:52

## 2019-09-12 RX ADMIN — DICLOFENAC 2 G: 10 GEL TOPICAL at 21:43

## 2019-09-12 RX ADMIN — METFORMIN HYDROCHLORIDE 1000 MG: 500 TABLET ORAL at 09:55

## 2019-09-12 RX ADMIN — MIDODRINE HYDROCHLORIDE 5 MG: 5 TABLET ORAL at 09:59

## 2019-09-12 RX ADMIN — MIDODRINE HYDROCHLORIDE 5 MG: 5 TABLET ORAL at 17:57

## 2019-09-12 RX ADMIN — ASPIRIN 81 MG: 81 TABLET ORAL at 09:55

## 2019-09-12 RX ADMIN — ACYCLOVIR 200 MG: 200 CAPSULE ORAL at 09:54

## 2019-09-12 RX ADMIN — DIGOXIN 125 MCG: 125 TABLET ORAL at 09:55

## 2019-09-12 ASSESSMENT — PAIN SCALES - GENERAL
PAINLEVEL_OUTOF10: 0

## 2019-09-12 NOTE — PROGRESS NOTES
General PROM: lacking approx 20 degrees knee extension, knee flexion limited to 90  AROM RLE (degrees)  RLE General AROM: lacking approx 25 degrees knee extension, knee flexion limited to 90(pt reports he cannot move knees further due to sensations of tightness and pain)  PROM LLE (degrees)  LLE General PROM: lacking approx 25 degrees knee extension, knee flexion limited to 90(pt reports he cannot move knees further due to sensations of tightness and pain)  AROM LLE (degrees)  LLE General AROM: lacking approx 30 degrees knee extension, knee flexion limited to 90  AROM RUE (degrees)  RUE AROM : WFL  RUE General AROM: see OT notes for additional assessment  AROM LUE (degrees)  LUE AROM : WFL  LUE General AROM: see OT notes for additional assessment  Strength RLE  Comment: hip flexion 4+/5, knee extension 4/5, knee flexion 4-/5, DF 4+/5,   Strength LLE  Comment: hip flexion 3+/5 due to pain, pt assisted leg with arms to reach end of range, able to hold at end range against pressure, knee extension 4/5, knee flexion 4-/5, DF 4+/5        Bed mobility  Bridging: Unable to assess(attempted but pt unable to clear hips / unwilling to try with assist due to pain)  Rolling to Left: Stand by assistance  Rolling to Right: Stand by assistance  Supine to Sit: Stand by assistance  Sit to Supine: Stand by assistance  Comment: Pt requires SBA to maintain awareness of EOB and position pillows  Transfers  Sit to Stand: Minimal Assistance; Moderate Assistance  Stand to sit: Minimal Assistance  Bed to Chair: Minimal assistance  Comment: Initial attempt sit to stand was mod assist due to: feet sliding, low seat height. Pt improved to min assist with non-skid socks, elevated seat, and cues to scoot foward and pre-position feet. Ambulation  Ambulation?: Yes  Ambulation 1  Surface: level tile  Device: Rolling Walker  Assistance: Minimal assistance; Moderate assistance  Quality of Gait: Pt relies heavily on bilateral UEs for support.  Pt avoids judgement, and was responsible for assessment and treatment of the patient.          AM and PM - Electronically signed by Madie Woodson, PT #[id number] on 9/12/2019 at 4:10 PM

## 2019-09-12 NOTE — H&P
2.3 oz (92.6 kg)   09/11/19 1539 -- -- -- -- -- 93 % -- --   09/11/19 1511 120/69 98.1 °F (36.7 °C) Oral 87 18 93 % -- --   09/11/19 1500 -- -- -- -- -- -- 6' (1.829 m) 209 lb 14.1 oz (95.2 kg)     Psych: Stable mood, normal judgement, normal affect   Const: No distress  Eyes: Conjunctiva noninjected, no icterus noted; pupils equal, round, and reactive to light. HENT: Atraumatic, normocephalic; Oral mucosa moist  Neck: Trachea midline, neck supple. No thyromegaly noted. CV: IRRegular rate and rhythm, no murmur rub or gallop noted  Resp: Lungs clear to auscultation bilaterally, no rales wheezes or ronchi, no retractions. Respirations unlabored. GI: Soft, nontender, nondistended. Normal bowel sounds. No palpable masses. Neuro: Alert, oriented, appropriate. No cranial nerve deficits appreciated. Motor examination reveals 4+/5 strength in all four limbs diffusely. Skin: Normal temperature and turgor  MSK: No joint abnormalities noted. Pain in knees with attempted knee flexion beyond 90 degrees, crepitus noted bilaterally. Ext: No significant edema appreciated. No varicosities. Lab Results   Component Value Date    WBC 5.7 08/02/2017    HGB 9.5 (L) 08/02/2017    HCT 28.1 (L) 08/02/2017    MCV 98.8 08/02/2017     08/02/2017     Lab Results   Component Value Date    INR 1.24 (H) 08/01/2017    PROTIME 14.0 (H) 08/01/2017     Lab Results   Component Value Date    CREATININE 1.0 08/02/2017    BUN 23 (H) 08/02/2017     08/02/2017    K 3.7 08/02/2017     08/02/2017    CO2 27 08/02/2017     Lab Results   Component Value Date    ALT 9 (L) 07/27/2017    AST 13 (L) 07/27/2017    ALKPHOS 45 07/27/2017    BILITOT 0.7 07/27/2017       No results found. The above labs and diagnostic studies have been reviewed by myself upon admission to inpatient rehabilitation.         Barriers to Discharge: Patient  has a past medical history of A-fib [facility]), Aortic stenosis, Atrial fibrillation ([facility] [address] Biclonal gammopathy, BPH (benign prostatic hyperplasia), CAD (coronary artery disease), Cataracts, bilateral, Diabetes, HTN (hypertension), Hyperlipidemia, and Mitral regurgitation. Pt lives alone. POST ADMISSION PHYSICIAN EVALUATION  The patient has agreed to being admitted to our comprehensive inpatient  rehabilitation facility consisting of at least 180 minutes of therapy a day,  5 out of 7 days a week. The patient/family has a good understanding of our discharge process. The  patient has potential to make improvement and is in need of at least two of  the following multidisciplinary therapies including but not limited to  physical, occupational, respiratory, and speech, nutritional services, wound care, and prosthetics and orthotics. Given the patients complex condition  and risk of further medical complications, rehabilitation services cannot be  safely provided at a lower level of care such as a skilled nursing facility. I have compared the patients medical and functional status at the time of the  preadmission screening and the same on this date, and there are no significant changes. By signing this document, I acknowledge that I have personally performed a  full physical examination on this patient within 24 hours of admission to  this inpatient rehabilitation facility and have determined the patient to be  able to tolerate the above course of treatment at an intensive level for a  reasonable period of time. I will be completing a detailed individualized  Plan of Care for this patient by day four of the patients stay based upon the  Preadmission Screen, this Post-Admission Evaluation, and the therapy  evaluations. Assessment and Plan:    Bilateral knee arthritis-start Celebrex, diclofenac gel. Multiple myeloma-chemotherapy on hold, Dr. Jose G Palomino.  Fib- Coumadin    Diabetes - glipizide, metformin    Hyperlipidemia- Zocor    CAD with stent placement, CHF- ASA, Lanoxin    Hypertension-

## 2019-09-12 NOTE — PROGRESS NOTES
Occupational Therapy   Occupational Therapy Initial Assessment  Date: 2019   Patient Name: Roosevelt Muhammad  MRN: [id number]     : 1932    Date of Service: 2019    Discharge Recommendations:  Home with assist PRN, Home with Home health OT  OT Equipment Recommendations  Other: TBD, he had just gotten bedside commode and shower chair prior to this admission    Assessment   Performance deficits / Impairments: Decreased high-level IADLs;Decreased balance;Decreased endurance;Decreased functional mobility ; Decreased ADL status  Assessment: Pt is an 81 yo male who had a fell, was admitted to [facility] then transferred to [facility] due to weakness in his LE, decreased ability to ambulate with pain in knees. He was receiving chemo therapy for his multiple myeloma which may have been contributing to his weakness per Dr Tamara Li. Pt is currently functioning below his baseline in above areas - requires min assist for LB bathing, mod for LB dressing. Transfers with min assist using rolling walker,has difficulty flexing his knees far enough to stand efficiently. Anticipate pt will require 2 weeks inpt rehab with skilled OT services to maximize independence for safe return home alone  Treatment Diagnosis: decreased ADL, IADL, mobility, balance  Prognosis: Good  Decision Making: High Complexity  History:  Patient is an 80-year-old male who came in to the hospital after having frequent falls at home. His legs gave out on him and he fell, hitting the ground with both knees. He lives at home alone. He does have a history of low blood pressure and is taking midodrine for this. Workup in the hospital revealed arthritis involving his knees. He is also known to have a history of A. Fib for which he takes Coumadin, diabetes, and hyperlipidemia. He also has CAD with previous stent placement, CHF, and previous hypertension.   He was started in therapies, but was noted to be very weak,and not safe to return home Transportation: SUV  Occupation: Retired  Type of occupation: distillery, labor  Leisure & Hobbies: TV, mows grass with riding mower       Objective   Vision: Impaired  Vision Exceptions: Wears glasses at all times  Hearing: Exceptions to [facility]  Hearing Exceptions: Hard of hearing/hearing concerns    Orientation  Overall Orientation Status: Within Functional Limits    Functional Mobility  Functional - Mobility Device: Standard Walker(only walker in room, will change to rolling)  Assist Level: Minimal assistance(5 ft from commode to shower chair)  Functional Mobility Comments: cues to keep walker around him, states knees buckle at home, so very cautious, min assist for balance for safety due to history of falls  Shower Transfers  Shower - Transfer Type: To and From  Shower - Transfer To: Shower seat with back  Shower - Technique: Ambulating  Shower Transfers: Minimal assistance  Shower Transfers Comments: sit >< stand due to pain in knees  Fim 2  Wheelchair Bed Transfers  Wheelchair/Bed - Technique: Ambulating  Equipment Used: Wheelchair  Level of Asssistance: Minimal assistance; Moderate assistance  Wheelchair Transfers Comments: sit >< stand due to pain in knees  Fim 2  ADL  Feeding: Modified independent (partial plates)  Grooming: Setup stand by assist(cleaned partial plates and teeth after set up, combed hair, )   UE Bathing: Setup  LE Bathing: Moderate assistance(assisted to dry bottom of feet, stood with min assist to bathe buttocks and tee area)  UE Dressing: Setup  LE Dressing: Moderate assistance(able to thread underwear and shorts over feet, min assist for back of pants/underwear. Assisted with DECLAN hose, and shoes)  Toileting: Contact guard assistance(stood at commode to urinate with CGA/SBA.   did not have clothing on to manage, so no FIM)  Tone RUE  RUE Tone: Normotonic  Tone LUE  LUE Tone: Normotonic  Coordination  Movements Are Fluid And Coordinated: Yes     Bed mobility  Supine to Sit: Stand by assistance(hospital bed flat, but did use rail minimally )        Cognition  Overall Cognitive Status: WFL        Sensation  Overall Sensation Status: WFL        LUE AROM (degrees)  LUE AROM : WFL  RUE AROM (degrees)  RUE AROM : WFL  LUE Strength  Gross LUE Strength: WFL  RUE Strength  Gross RUE Strength: WFL                   Plan   Plan  Times per week: 5-6 days per week  Times per day: Twice a day  Plan weeks: 2 weeks  Current Treatment Recommendations: Strengthening, Balance Training, Endurance Training, Patient/Caregiver Education & Training, Home Management Training, Functional Mobility Training, Safety Education & Training, Equipment Evaluation, Education, & procurement, Self-Care / ADL                 Goals  Short term goals  Time Frame for Short term goals: 1 week pt will. Short term goal 1: bathe with SBA and AD  Short term goal 2: dress LB with min and AD  Short term goal 3: toilet with CGA and AD  Short term goal 4: transfer with CGA and AD  Short term goal 5: functional mobilitiy and simple meal prep with CGA and AD  Short term goal 6: increase activity tolerance to stand 5 min for ADl/IADL tasks  Long term goals  Time Frame for Long term goals : 2 weeks pt will. .  Long term goal 1: bathe indep with AD  Long term goal 2: dress indep with AD  Long term goal 3: toilet indep with AD   Long term goal 4: transfer indep with AD  Long term goal 5: functional mobility and simple meal prep indep with AD  Patient Goals   Patient goals : \"I want to be able to walk, get back to my house, be able to take care of myself       Therapy Time   Individual Concurrent Group Co-treatment   Time In 0815         Time Out 0945         Minutes 90         Timed Code Treatment Minutes: 75 Minutes(+15 min eval)       Dayday Morin, OTR/L 770

## 2019-09-13 LAB
GLUCOSE BLD-MCNC: 134 MG/DL (ref 70–99)
GLUCOSE BLD-MCNC: 173 MG/DL (ref 70–99)
GLUCOSE BLD-MCNC: 201 MG/DL (ref 70–99)
GLUCOSE BLD-MCNC: 201 MG/DL (ref 70–99)
INR BLD: 3.11 (ref 0.86–1.14)
PERFORMED ON: ABNORMAL
PROTHROMBIN TIME: 35.4 SEC (ref 9.8–13)

## 2019-09-13 PROCEDURE — 36415 COLL VENOUS BLD VENIPUNCTURE: CPT

## 2019-09-13 PROCEDURE — 97530 THERAPEUTIC ACTIVITIES: CPT | Performed by: PHYSICAL THERAPIST

## 2019-09-13 PROCEDURE — 97110 THERAPEUTIC EXERCISES: CPT | Performed by: PHYSICAL THERAPIST

## 2019-09-13 PROCEDURE — 97530 THERAPEUTIC ACTIVITIES: CPT

## 2019-09-13 PROCEDURE — 97110 THERAPEUTIC EXERCISES: CPT

## 2019-09-13 PROCEDURE — 97535 SELF CARE MNGMENT TRAINING: CPT

## 2019-09-13 PROCEDURE — 97116 GAIT TRAINING THERAPY: CPT | Performed by: PHYSICAL THERAPIST

## 2019-09-13 PROCEDURE — 6370000000 HC RX 637 (ALT 250 FOR IP): Performed by: PHYSICAL MEDICINE & REHABILITATION

## 2019-09-13 PROCEDURE — 1280000000 HC REHAB R&B

## 2019-09-13 PROCEDURE — 94760 N-INVAS EAR/PLS OXIMETRY 1: CPT

## 2019-09-13 PROCEDURE — 85610 PROTHROMBIN TIME: CPT

## 2019-09-13 RX ADMIN — TAMSULOSIN HYDROCHLORIDE 0.4 MG: 0.4 CAPSULE ORAL at 08:34

## 2019-09-13 RX ADMIN — CELECOXIB 200 MG: 200 CAPSULE ORAL at 08:33

## 2019-09-13 RX ADMIN — ASPIRIN 81 MG: 81 TABLET ORAL at 08:34

## 2019-09-13 RX ADMIN — SIMVASTATIN 20 MG: 20 TABLET, FILM COATED ORAL at 20:07

## 2019-09-13 RX ADMIN — MIDODRINE HYDROCHLORIDE 5 MG: 5 TABLET ORAL at 12:53

## 2019-09-13 RX ADMIN — MIDODRINE HYDROCHLORIDE 5 MG: 5 TABLET ORAL at 17:25

## 2019-09-13 RX ADMIN — DICLOFENAC 2 G: 10 GEL TOPICAL at 08:35

## 2019-09-13 RX ADMIN — Medication 400 MG: at 12:53

## 2019-09-13 RX ADMIN — DICLOFENAC 2 G: 10 GEL TOPICAL at 12:54

## 2019-09-13 RX ADMIN — MIDODRINE HYDROCHLORIDE 5 MG: 5 TABLET ORAL at 08:34

## 2019-09-13 RX ADMIN — METOPROLOL TARTRATE 25 MG: 25 TABLET ORAL at 08:34

## 2019-09-13 RX ADMIN — Medication 400 MG: at 20:07

## 2019-09-13 RX ADMIN — CALCIUM 500 MG: 500 TABLET ORAL at 08:34

## 2019-09-13 RX ADMIN — CALCIUM 500 MG: 500 TABLET ORAL at 17:25

## 2019-09-13 RX ADMIN — Medication 400 MG: at 08:34

## 2019-09-13 RX ADMIN — DICLOFENAC 2 G: 10 GEL TOPICAL at 20:08

## 2019-09-13 RX ADMIN — ACYCLOVIR 200 MG: 200 CAPSULE ORAL at 20:07

## 2019-09-13 RX ADMIN — ALLOPURINOL 100 MG: 100 TABLET ORAL at 08:34

## 2019-09-13 RX ADMIN — DIGOXIN 125 MCG: 125 TABLET ORAL at 08:34

## 2019-09-13 RX ADMIN — GLIPIZIDE 2.5 MG: 5 TABLET ORAL at 05:31

## 2019-09-13 RX ADMIN — ACYCLOVIR 200 MG: 200 CAPSULE ORAL at 08:34

## 2019-09-13 RX ADMIN — METFORMIN HYDROCHLORIDE 1000 MG: 500 TABLET ORAL at 17:25

## 2019-09-13 RX ADMIN — METFORMIN HYDROCHLORIDE 1000 MG: 500 TABLET ORAL at 08:33

## 2019-09-13 ASSESSMENT — PAIN SCALES - GENERAL
PAINLEVEL_OUTOF10: 0
PAINLEVEL_OUTOF10: 0

## 2019-09-13 NOTE — PROGRESS NOTES
Patient admitted with Debility. Patient alert and oriented x 4 . VSS. Patient's last bowel movement noted on September , 12 , 2019. Patient transfers x 1 with walker. Patient aware of room and surroundings. Morning assessment complete. Patient educated on use of call light. Medication administered this morning with no complications. Patient current pain level status: denies, Interventions made as necessary: none at this time per prn, Celebrex scheduled. The use of rest and other techniques specific to this patient are used as a means of non-pharmacological interventions for pain. Continuing education specific to the this patient is continue throughout stay. Bed/Chair alarm on  and call light within reach. Input and output being monitored along with daily weights. Patient has no further needs at this time. RN will continue to monitor.

## 2019-09-13 NOTE — PROGRESS NOTES
Patient admitted with Debility. Pt. A&O x4. VSS on room air. Shift assessment complete. Patient transfers with CGA with walker/gait belt. Pt. calls out appropriately for toileting needs. Last bowel movement noted on 9/12. Tolerating Carb control diet well. Medication administered, pt. tolerated well. C/O mild pain/discomfort in knees with movement, diclofenac gel applied per orders. Fall precautions in place, call light and belongings within reach. Pt. resting peacefully. Will continue to monitor.   Electronically signed by Jh Dunaway RN on 9/13/2019 at 3:54 AM

## 2019-09-13 NOTE — PROGRESS NOTES
bilaterally  Ankle Pumps: seated x 10                     Second Session:  Subjective: Pt reports no pain and ready for therapy. Objective:  - began with seated exercises with emphasis on full AROM: seated marches x 10 bilaterally, seated knee extension x 10 bilaterally, 2 min HS stretch each leg,   - ambulated 20' across gym, ascend / descend curb step with RW and min assist, and returned 20' to low mat  - taught supine exercises: SLR x 10 each leg (pt requires min assist for left), sidelying clam shells x 10 bilaterally, short arc quads x 10 bilaterally, adductor set x 10 with 3 sec hold  - measured knee ROM:   ~ seated: knee flexion R = 97, knee flexion L = 95, knee extension R = lacking 20, knee extension L = lacking 20,    ~ supine: knee extension R = lacking 15, L = lacking 15    PM Assessment: Pt continues to show improvement in knee ROM and control. Pt able to ambulate across gym with CGA and had one transfer with CGA to min assist. Continued to encourage pt to use ice in evenings to further decrease swelling. Pt returned to room via transport. Goals  Short term goals  Time Frame for Short term goals: 5 days  Short term goal 1: Pt will perform bed mobility with MI. Short term goal 2: Pt will transfer with CGA. Short term goal 3: Pt will ambulate 48' with min assist with RW. - met 9/13/19  Short term goal 4: Pt will ascend / descend curb step with RW and min assist.  Long term goals  Time Frame for Long term goals : 10 days  Long term goal 1: Pt will transfer with MI using RW. Long term goal 2: Pt will ambulate 150' with MI using RW. Long term goal 3: Pt will ascend / descend curb step with RW and CGA assist.  Long term goal 4: Pt will ascend / descend 4 steps with SUP using one rail. Patient Goals   Patient goals : Be able to walk.      Plan    Plan  Times per week: 5-6  Times per day: Twice a day  Plan weeks: 7-10 days  Current Treatment Recommendations: Strengthening, ROM, Balance Training,

## 2019-09-13 NOTE — CONSULTS
Oncology Consult    See dictation    A/P:  1. Myeloma/FTT. I will hold his chemotherapy until he recovers from this. I will likely switch him to an oral chemotherapy as an outpt  His Velcade could have certainly contributed to his leg weakness in addition to his arthritis and other medical conditions including his cardiac disease. 2.  Infectious disease--he is afebrile. Thank you for the consultation. I will follow closely.     Paresh Brink MD
Orders: None  · Anthropometric Measures:  · Ht: 6' (182.9 cm)   · Current Body Wt: 204 lb (92.5 kg)  · Usual Body Wt: 196 lb (88.9 kg)  · % Weight Change:  ,  pt unsure of wt changes, did report his last weight at 196 lb   · Ideal Body Wt: 178 lb (80.7 kg),   · BMI Classification: BMI 25.0 - 29.9 Overweight    Nutrition Interventions:   Continue current diet, Start ONS  Continued Inpatient Monitoring    Nutrition Evaluation:   · Evaluation: Goals set   · Goals:  Tolerate diet and consume greater than 50% of meals and supplements this admission     · Monitoring: Meal Intake, Supplement Intake, Diet Tolerance, Weight      Electronically signed by Elana Banuelos RD, LD on 9/12/19 at 1:06 PM    Contact Number: [phone]

## 2019-09-13 NOTE — PROGRESS NOTES
Occupational Therapy  Facility/Department: [address] IP REHAB  Daily Treatment Note  NAME: Marilyn Brown  : 1932  MRN: [id number]    Date of Service: 2019    Discharge Recommendations:  Home with assist PRN, Home with Home health OT       Assessment   Performance deficits / Impairments: Decreased high-level IADLs;Decreased balance;Decreased endurance;Decreased functional mobility ; Decreased ADL status  Assessment: Pt tolerated session well. Pt completed transfers with min assist from lower surfaces. Pt reports he has a handicap height toilet at home and also RTS. Treatment Diagnosis: decreased ADL, IADL, mobility, balance  Prognosis: Good  History:  Patient is an 63-year-old male who came in to the hospital after having frequent falls at home. His legs gave out on him and he fell, hitting the ground with both knees. He lives at home alone. He does have a history of low blood pressure and is taking midodrine for this. Workup in the hospital revealed arthritis involving his knees. He is also known to have a history of A. Fib for which he takes Coumadin, diabetes, and hyperlipidemia. He also has CAD with previous stent placement, CHF, and previous hypertension. He was started in therapies, but was noted to be very weak,and not safe to return home alone. He is now admitted to our rehabilitation unit to work on improving his leg strength, transfers, gait, self-care activities before discharge. He does have help at home from his daughter, and she lives nearby. (copied per note Dr Irma Noland 19)  REQUIRES OT FOLLOW UP: Yes  Activity Tolerance  Activity Tolerance: Patient Tolerated treatment well  Safety Devices  Type of devices: Gait belt         Patient Diagnosis(es): There were no encounter diagnoses.       has a past medical history of A-fib [facility]), Aortic stenosis, Atrial fibrillation ([facility] [address] Biclonal gammopathy, BPH (benign prostatic hyperplasia), CAD (coronary artery disease), [facility] mobility and simple meal prep indep with AD  Patient Goals   Patient goals : \"I want to be able to walk, get back to my house, be able to take care of myself       Therapy Time   Individual Concurrent Group Co-treatment   Time In 1115         Time Out 1200         Minutes 1200 [facility], [address]

## 2019-09-13 NOTE — PATIENT CARE CONFERENCE
[facility]  Inpatient Rehabilitation  Weekly Team Conference Note      Date: 2019  Patient Name:  Perla Ann    MRN: [id number]  : 1932  Gender: male  Physician: Dr Hattie So   Diagnosis: Arthritis of both knees [M17.0]  Arthritis of both knees [M17.0]    CASE MANAGEMENT  Assessment:    Goal is home. PHYSICAL THERAPY    Bed mobility  Bridging: Contact guard assistance  Rolling to Left: Independent  Rolling to Right: Independent  Supine to Sit: Independent  Sit to Supine: Independent  Scooting: Independent  Comment: returned to bed at end of therapy session to take a nap    Transfers:  Sit to Stand: Contact guard assistance  Stand to sit: Contact guard assistance  Bed to Chair: Contact guard assistance, Stand by assistance(turning and backing up with wheeled walker)  Comment: bilateral knees remain stiff, but patient is able to accommodate sit to stand by using UEs effectively    Ambulation 1  Surface: level tile, uneven, outdoors  Device: [address]: Contact guard assistance  Quality of Gait: knees remain flexed throughout ambulation, but lacking 15-20* extension even passively, forward flexion of hips and low back during ambulation  Gait Deviations: Slow Jeri  Distance: 60'x2, 165' outdoors on uneven surfaces, shorter distances in gym with multiple turns to access curb and steps  Comments: Pt showing significant improvement from yesterday as evidenced by decreased dependence on UEs for ambulation and increased WB.     Stairs  # Steps : 8  Stairs Height: 6\"  Rails: Bilateral  Curbs: 6\"  Device: Rolling walker(with curb step)  Assistance: Contact guard assistance  Comment: cues for walker safety with curb step and CGA for balance, initially nonreciprocal gait pattern but then used reciprocal gait pattern to ascend/descend steps with bilateral rails    Car Transfer: Minimal Assistance, Contact guard assistance      FIMS:  Bed, Chair, Wheel Chair: 5 - Requires mobility     Interdisciplinary Individualized Plan of Care Review:    · Continue Current Plan of Care: Yes    · Modifications:_____________________________    Special Needs in the Upcoming Week :    [] Family/Caregiver Education  [] Home visit  []Therapeutic Pass   [] Consults:_______    [] Other;_______    Patient Rehab Team Goals for the Upcoming Week:  1. Improve standing balance and household distance ambulation with wheeled walker to allow patient to maximize independence with self-care activities. 2. Transfers independently with RW    3. Patient goals : \"I want to be able to walk, get back to my house, be able to take care of myself          Team Members Present at Conference:  Physician: Dr Margy Concepcion   : Camelia Tirado    Occupational Therapist: Quinn Urbina, WESTONR/L  Physical Therapist: Geo Watts, SPT/Electronically signed by Gil Cuevas PT [id number] on 9/13/2019 at 4:31 PM  Speech Therapist:   Nurse: Prudence Mills RN, CRRN  Dietician: Scott Mcneal RD, LD   [facility] [place]        I led this team conference and I approve the established interdisciplinary plan of care as documented within the medical record of [facility].     MD: Dr. Margy Concepcion

## 2019-09-14 LAB
GLUCOSE BLD-MCNC: 109 MG/DL (ref 70–99)
GLUCOSE BLD-MCNC: 123 MG/DL (ref 70–99)
GLUCOSE BLD-MCNC: 130 MG/DL (ref 70–99)
GLUCOSE BLD-MCNC: 170 MG/DL (ref 70–99)
INR BLD: 2.63 (ref 0.86–1.14)
PERFORMED ON: ABNORMAL
PROTHROMBIN TIME: 30 SEC (ref 9.8–13)

## 2019-09-14 PROCEDURE — 97530 THERAPEUTIC ACTIVITIES: CPT

## 2019-09-14 PROCEDURE — 97110 THERAPEUTIC EXERCISES: CPT | Performed by: PHYSICAL THERAPIST

## 2019-09-14 PROCEDURE — 6370000000 HC RX 637 (ALT 250 FOR IP): Performed by: PHYSICAL MEDICINE & REHABILITATION

## 2019-09-14 PROCEDURE — 94760 N-INVAS EAR/PLS OXIMETRY 1: CPT

## 2019-09-14 PROCEDURE — 97116 GAIT TRAINING THERAPY: CPT | Performed by: PHYSICAL THERAPIST

## 2019-09-14 PROCEDURE — 97535 SELF CARE MNGMENT TRAINING: CPT

## 2019-09-14 PROCEDURE — 36415 COLL VENOUS BLD VENIPUNCTURE: CPT

## 2019-09-14 PROCEDURE — 85610 PROTHROMBIN TIME: CPT

## 2019-09-14 PROCEDURE — 97530 THERAPEUTIC ACTIVITIES: CPT | Performed by: PHYSICAL THERAPIST

## 2019-09-14 PROCEDURE — 1280000000 HC REHAB R&B

## 2019-09-14 RX ORDER — LANOLIN ALCOHOL/MO/W.PET/CERES
3 CREAM (GRAM) TOPICAL NIGHTLY PRN
Status: DISCONTINUED | OUTPATIENT
Start: 2019-09-14 | End: 2019-09-19 | Stop reason: HOSPADM

## 2019-09-14 RX ORDER — WARFARIN SODIUM 2.5 MG/1
2.5 TABLET ORAL
Status: COMPLETED | OUTPATIENT
Start: 2019-09-14 | End: 2019-09-14

## 2019-09-14 RX ADMIN — ACYCLOVIR 200 MG: 200 CAPSULE ORAL at 21:46

## 2019-09-14 RX ADMIN — MIDODRINE HYDROCHLORIDE 5 MG: 5 TABLET ORAL at 18:01

## 2019-09-14 RX ADMIN — WARFARIN SODIUM 2.5 MG: 2.5 TABLET ORAL at 18:01

## 2019-09-14 RX ADMIN — DIGOXIN 125 MCG: 125 TABLET ORAL at 09:14

## 2019-09-14 RX ADMIN — MELATONIN 3 MG: at 21:46

## 2019-09-14 RX ADMIN — CELECOXIB 200 MG: 200 CAPSULE ORAL at 09:14

## 2019-09-14 RX ADMIN — ASPIRIN 81 MG: 81 TABLET ORAL at 09:14

## 2019-09-14 RX ADMIN — Medication 400 MG: at 21:46

## 2019-09-14 RX ADMIN — DICLOFENAC 2 G: 10 GEL TOPICAL at 09:17

## 2019-09-14 RX ADMIN — Medication 400 MG: at 13:32

## 2019-09-14 RX ADMIN — Medication 400 MG: at 09:14

## 2019-09-14 RX ADMIN — METFORMIN HYDROCHLORIDE 1000 MG: 500 TABLET ORAL at 18:01

## 2019-09-14 RX ADMIN — METOPROLOL TARTRATE 25 MG: 25 TABLET ORAL at 09:14

## 2019-09-14 RX ADMIN — ACYCLOVIR 200 MG: 200 CAPSULE ORAL at 09:14

## 2019-09-14 RX ADMIN — DICLOFENAC 2 G: 10 GEL TOPICAL at 21:47

## 2019-09-14 RX ADMIN — DICLOFENAC 2 G: 10 GEL TOPICAL at 14:02

## 2019-09-14 RX ADMIN — CALCIUM 500 MG: 500 TABLET ORAL at 18:01

## 2019-09-14 RX ADMIN — GLIPIZIDE 2.5 MG: 5 TABLET ORAL at 05:34

## 2019-09-14 RX ADMIN — TAMSULOSIN HYDROCHLORIDE 0.4 MG: 0.4 CAPSULE ORAL at 09:14

## 2019-09-14 RX ADMIN — MIDODRINE HYDROCHLORIDE 5 MG: 5 TABLET ORAL at 09:15

## 2019-09-14 RX ADMIN — METFORMIN HYDROCHLORIDE 1000 MG: 500 TABLET ORAL at 09:14

## 2019-09-14 RX ADMIN — ALLOPURINOL 100 MG: 100 TABLET ORAL at 09:14

## 2019-09-14 RX ADMIN — CALCIUM 500 MG: 500 TABLET ORAL at 09:13

## 2019-09-14 RX ADMIN — MIDODRINE HYDROCHLORIDE 5 MG: 5 TABLET ORAL at 13:32

## 2019-09-14 RX ADMIN — SIMVASTATIN 20 MG: 20 TABLET, FILM COATED ORAL at 21:46

## 2019-09-14 ASSESSMENT — PAIN SCALES - GENERAL: PAINLEVEL_OUTOF10: 0

## 2019-09-14 NOTE — PROGRESS NOTES
Pt resting in bed comfortably. A&O X4. Admitted on 9/11/2019 S/P fall, arthritis. VSS. Denies any numbness and tingling. HR regular. Patient denies any palpitation, chest pain, or chest tightness. Lungs sounds clear. Abd soft and nontender. Active bowel sounds.had BM today. Continent of bowel & bladder. Skin dry and intact. Lotion applied to BLE. Fritz hose removed and washed. HS medication given. Tolerated well. Education provided on CHF, medication, fall precaution, skin care, and pain management. Assisted to BE with walker X1. Call light in reach. Patient instructed to call if there is any needs or changes.  Electronically signed by Siddharth Light RN on 9/14/2019 at 11:59 PM

## 2019-09-14 NOTE — PROGRESS NOTES
in gym with multiple turns to access curb and steps  OT  PT Equipment Recommendations  Equipment Needed: No  Other: Pt states he has RW and cane but only uses them when he wants to. Toilet - Technique: Ambulating  Equipment Used: Grab bars  Toilet Transfers Comments: Does not transfer but stands in front of toilet to urinate. Rec using RW and holding onto nearby sink for this activity at  home                    Assessment and Plan:     Bilateral knee arthritis-start Celebrex, diclofenac gel.     Multiple myeloma-chemotherapy on hold, Dr. Anisha Henson. Fib- Coumadin     Diabetes - glipizide, metformin     Hyperlipidemia- Zocor     CAD with stent placement, CHF- ASA, Lanoxin     Hypertension- Lopressor     Gout- allopurinol     Bowels: Schedule Miralax + Senna S. Follow bowel movements. Enema or suppository if needed.      Bladder: Check PVR x 3.   [address] if PVR > 200ml or if any volume is > 500 ml.      Pain: Tylenol is ordered prn.          Edy Kilpatrick MD, 9/14/2019, 2:20 PM

## 2019-09-15 LAB
GLUCOSE BLD-MCNC: 132 MG/DL (ref 70–99)
GLUCOSE BLD-MCNC: 138 MG/DL (ref 70–99)
GLUCOSE BLD-MCNC: 154 MG/DL (ref 70–99)
GLUCOSE BLD-MCNC: 202 MG/DL (ref 70–99)
INR BLD: 2.65 (ref 0.86–1.14)
PERFORMED ON: ABNORMAL
PROTHROMBIN TIME: 30.2 SEC (ref 9.8–13)

## 2019-09-15 PROCEDURE — 36415 COLL VENOUS BLD VENIPUNCTURE: CPT

## 2019-09-15 PROCEDURE — 85610 PROTHROMBIN TIME: CPT

## 2019-09-15 PROCEDURE — 6370000000 HC RX 637 (ALT 250 FOR IP): Performed by: PHYSICAL MEDICINE & REHABILITATION

## 2019-09-15 PROCEDURE — 94760 N-INVAS EAR/PLS OXIMETRY 1: CPT

## 2019-09-15 PROCEDURE — 1280000000 HC REHAB R&B

## 2019-09-15 RX ORDER — WARFARIN SODIUM 2.5 MG/1
2.5 TABLET ORAL
Status: COMPLETED | OUTPATIENT
Start: 2019-09-15 | End: 2019-09-15

## 2019-09-15 RX ADMIN — Medication 400 MG: at 14:04

## 2019-09-15 RX ADMIN — DICLOFENAC 2 G: 10 GEL TOPICAL at 14:05

## 2019-09-15 RX ADMIN — ALLOPURINOL 100 MG: 100 TABLET ORAL at 10:06

## 2019-09-15 RX ADMIN — Medication 400 MG: at 21:42

## 2019-09-15 RX ADMIN — Medication 400 MG: at 10:05

## 2019-09-15 RX ADMIN — CELECOXIB 200 MG: 200 CAPSULE ORAL at 10:06

## 2019-09-15 RX ADMIN — ACYCLOVIR 200 MG: 200 CAPSULE ORAL at 10:06

## 2019-09-15 RX ADMIN — DIGOXIN 125 MCG: 125 TABLET ORAL at 10:05

## 2019-09-15 RX ADMIN — METOPROLOL TARTRATE 25 MG: 25 TABLET ORAL at 10:05

## 2019-09-15 RX ADMIN — MIDODRINE HYDROCHLORIDE 5 MG: 5 TABLET ORAL at 10:06

## 2019-09-15 RX ADMIN — SENNOSIDES, DOCUSATE SODIUM 2 TABLET: 50; 8.6 TABLET, FILM COATED ORAL at 21:42

## 2019-09-15 RX ADMIN — ASPIRIN 81 MG: 81 TABLET ORAL at 10:05

## 2019-09-15 RX ADMIN — CALCIUM 500 MG: 500 TABLET ORAL at 10:06

## 2019-09-15 RX ADMIN — METFORMIN HYDROCHLORIDE 1000 MG: 500 TABLET ORAL at 10:06

## 2019-09-15 RX ADMIN — METFORMIN HYDROCHLORIDE 1000 MG: 500 TABLET ORAL at 18:06

## 2019-09-15 RX ADMIN — SIMVASTATIN 20 MG: 20 TABLET, FILM COATED ORAL at 21:42

## 2019-09-15 RX ADMIN — CALCIUM 500 MG: 500 TABLET ORAL at 18:06

## 2019-09-15 RX ADMIN — GLIPIZIDE 2.5 MG: 5 TABLET ORAL at 10:05

## 2019-09-15 RX ADMIN — ACYCLOVIR 200 MG: 200 CAPSULE ORAL at 21:43

## 2019-09-15 RX ADMIN — TAMSULOSIN HYDROCHLORIDE 0.4 MG: 0.4 CAPSULE ORAL at 10:06

## 2019-09-15 RX ADMIN — MIDODRINE HYDROCHLORIDE 5 MG: 5 TABLET ORAL at 18:06

## 2019-09-15 RX ADMIN — WARFARIN SODIUM 2.5 MG: 2.5 TABLET ORAL at 18:06

## 2019-09-15 RX ADMIN — MIDODRINE HYDROCHLORIDE 5 MG: 5 TABLET ORAL at 14:04

## 2019-09-15 RX ADMIN — DICLOFENAC 2 G: 10 GEL TOPICAL at 10:08

## 2019-09-15 RX ADMIN — DICLOFENAC 2 G: 10 GEL TOPICAL at 21:43

## 2019-09-15 ASSESSMENT — PAIN SCALES - GENERAL
PAINLEVEL_OUTOF10: 0

## 2019-09-15 ASSESSMENT — PAIN - FUNCTIONAL ASSESSMENT: PAIN_FUNCTIONAL_ASSESSMENT: ACTIVITIES ARE NOT PREVENTED

## 2019-09-15 NOTE — PROGRESS NOTES
Department of Physical Medicine & Rehabilitation  Progress Note    Patient Identification:  Jin Wilhelm  [id number]  : 1932  Admit date: 2019      Diagnosis:   Patient Active Problem List   Diagnosis    CAD (coronary artery disease)    Hyperlipidemia    HTN (hypertension)    Nonrheumatic aortic valve stenosis    Non-rheumatic mitral regurgitation    Cardiomyopathy ([facility] [address]    Irregular heart beat    Chronic systolic congestive heart failure (HCC)    Nonrheumatic aortic valve stenosis    S/P TAVR (transcatheter aortic valve replacement)    Arthritis of both knees           Subjective: Pt seen this AM in room. Resting comfortably.  No c/o.     BP (!) 143/70   Pulse 82   Temp 97.5 °F (36.4 °C) (Oral)   Resp 17   Ht 6' (1.829 m)   Wt 202 lb 13.2 oz (92 kg)   SpO2 95%   BMI 27.51 kg/m²     Last 24 hour lab  Recent Results (from the past 24 hour(s))   POCT Glucose    Collection Time: 19  1:22 PM   Result Value Ref Range    POC Glucose 123 (H) 70 - 99 mg/dl    Performed on ACCU-CHEK    POCT Glucose    Collection Time: 19  5:15 PM   Result Value Ref Range    POC Glucose 109 (H) 70 - 99 mg/dl    Performed on ACCU-CHEK    POCT Glucose    Collection Time: 19  8:40 PM   Result Value Ref Range    POC Glucose 130 (H) 70 - 99 mg/dl    Performed on ACCU-CHEK    POCT Glucose    Collection Time: 09/15/19  7:37 AM   Result Value Ref Range    POC Glucose 132 (H) 70 - 99 mg/dl    Performed on ACCU-CHEK    Protime-INR    Collection Time: 09/15/19  9:23 AM   Result Value Ref Range    Protime 30.2 (H) 9.8 - 13.0 sec    INR 2.65 (H) 0.86 - 1.14       Therapy progress:  PT     Objective     Sit to Stand: Contact guard assistance  Stand to sit: Contact guard assistance  Bed to Chair: Contact guard assistance, Stand by assistance(turning and backing up with wheeled walker)  Device: Rolling Walker  Assistance: Contact guard assistance  Distance: 60'x2, 165' outdoors on uneven surfaces, shorter distances in gym with multiple turns to access curb and steps  OT  PT Equipment Recommendations  Equipment Needed: No  Other: Pt states he has RW and cane but only uses them when he wants to. Toilet - Technique: Ambulating  Equipment Used: Grab bars  Toilet Transfers Comments: Does not transfer but stands in front of toilet to urinate. Rec using RW and holding onto nearby sink for this activity at  home                    Assessment and Plan:     Bilateral knee arthritis-start Celebrex, diclofenac gel.     Multiple myeloma-chemotherapy on hold, Dr. Trena Morin. Fib- Coumadin     Diabetes - glipizide, metformin     Hyperlipidemia- Zocor     CAD with stent placement, CHF- ASA, Lanoxin     Hypertension- Lopressor     Gout- allopurinol     Bowels: Schedule Miralax + Senna S. Follow bowel movements. Enema or suppository if needed.      Bladder: Check PVR x 3.   [address] if PVR > 200ml or if any volume is > 500 ml.      Pain: Tylenol is ordered prn.          Mary Alfonso MD, 9/15/2019, 11:25 AM

## 2019-09-16 LAB
ANION GAP SERPL CALCULATED.3IONS-SCNC: 11 MMOL/L (ref 3–16)
BUN BLDV-MCNC: 11 MG/DL (ref 7–20)
CALCIUM SERPL-MCNC: 9.3 MG/DL (ref 8.3–10.6)
CHLORIDE BLD-SCNC: 102 MMOL/L (ref 99–110)
CO2: 28 MMOL/L (ref 21–32)
CREAT SERPL-MCNC: 0.8 MG/DL (ref 0.8–1.3)
GFR AFRICAN AMERICAN: >60
GFR NON-AFRICAN AMERICAN: >60
GLUCOSE BLD-MCNC: 118 MG/DL (ref 70–99)
GLUCOSE BLD-MCNC: 128 MG/DL (ref 70–99)
GLUCOSE BLD-MCNC: 157 MG/DL (ref 70–99)
GLUCOSE BLD-MCNC: 160 MG/DL (ref 70–99)
GLUCOSE BLD-MCNC: 163 MG/DL (ref 70–99)
HCT VFR BLD CALC: 33.4 % (ref 40.5–52.5)
HEMOGLOBIN: 11.4 G/DL (ref 13.5–17.5)
INR BLD: 2.73 (ref 0.86–1.14)
MAGNESIUM: 1.5 MG/DL (ref 1.8–2.4)
MCH RBC QN AUTO: 34 PG (ref 26–34)
MCHC RBC AUTO-ENTMCNC: 34.2 G/DL (ref 31–36)
MCV RBC AUTO: 99.3 FL (ref 80–100)
PDW BLD-RTO: 15.4 % (ref 12.4–15.4)
PERFORMED ON: ABNORMAL
PLATELET # BLD: 254 K/UL (ref 135–450)
PMV BLD AUTO: 7.5 FL (ref 5–10.5)
POTASSIUM SERPL-SCNC: 4.4 MMOL/L (ref 3.5–5.1)
PROTHROMBIN TIME: 31.1 SEC (ref 9.8–13)
RBC # BLD: 3.36 M/UL (ref 4.2–5.9)
SODIUM BLD-SCNC: 141 MMOL/L (ref 136–145)
WBC # BLD: 5.5 K/UL (ref 4–11)

## 2019-09-16 PROCEDURE — 97116 GAIT TRAINING THERAPY: CPT | Performed by: PHYSICAL THERAPIST

## 2019-09-16 PROCEDURE — 85610 PROTHROMBIN TIME: CPT

## 2019-09-16 PROCEDURE — 94760 N-INVAS EAR/PLS OXIMETRY 1: CPT

## 2019-09-16 PROCEDURE — 36415 COLL VENOUS BLD VENIPUNCTURE: CPT

## 2019-09-16 PROCEDURE — 97530 THERAPEUTIC ACTIVITIES: CPT

## 2019-09-16 PROCEDURE — 6370000000 HC RX 637 (ALT 250 FOR IP): Performed by: PHYSICAL MEDICINE & REHABILITATION

## 2019-09-16 PROCEDURE — 97110 THERAPEUTIC EXERCISES: CPT | Performed by: PHYSICAL THERAPIST

## 2019-09-16 PROCEDURE — 1280000000 HC REHAB R&B

## 2019-09-16 PROCEDURE — 80048 BASIC METABOLIC PNL TOTAL CA: CPT

## 2019-09-16 PROCEDURE — 97535 SELF CARE MNGMENT TRAINING: CPT

## 2019-09-16 PROCEDURE — 97110 THERAPEUTIC EXERCISES: CPT

## 2019-09-16 PROCEDURE — 85027 COMPLETE CBC AUTOMATED: CPT

## 2019-09-16 PROCEDURE — 97530 THERAPEUTIC ACTIVITIES: CPT | Performed by: PHYSICAL THERAPIST

## 2019-09-16 PROCEDURE — 6370000000 HC RX 637 (ALT 250 FOR IP): Performed by: PHARMACIST

## 2019-09-16 PROCEDURE — 83735 ASSAY OF MAGNESIUM: CPT

## 2019-09-16 RX ORDER — LANOLIN ALCOHOL/MO/W.PET/CERES
400 CREAM (GRAM) TOPICAL 4 TIMES DAILY
Status: DISCONTINUED | OUTPATIENT
Start: 2019-09-16 | End: 2019-09-19 | Stop reason: HOSPADM

## 2019-09-16 RX ORDER — WARFARIN SODIUM 2.5 MG/1
2.5 TABLET ORAL DAILY
Status: COMPLETED | OUTPATIENT
Start: 2019-09-16 | End: 2019-09-17

## 2019-09-16 RX ADMIN — CELECOXIB 200 MG: 200 CAPSULE ORAL at 09:05

## 2019-09-16 RX ADMIN — ACYCLOVIR 200 MG: 200 CAPSULE ORAL at 20:00

## 2019-09-16 RX ADMIN — Medication 400 MG: at 20:00

## 2019-09-16 RX ADMIN — MIDODRINE HYDROCHLORIDE 5 MG: 5 TABLET ORAL at 12:47

## 2019-09-16 RX ADMIN — MELATONIN 3 MG: at 20:01

## 2019-09-16 RX ADMIN — ALLOPURINOL 100 MG: 100 TABLET ORAL at 09:05

## 2019-09-16 RX ADMIN — DICLOFENAC 2 G: 10 GEL TOPICAL at 09:15

## 2019-09-16 RX ADMIN — GLIPIZIDE 2.5 MG: 5 TABLET ORAL at 09:12

## 2019-09-16 RX ADMIN — CALCIUM 500 MG: 500 TABLET ORAL at 09:05

## 2019-09-16 RX ADMIN — METFORMIN HYDROCHLORIDE 1000 MG: 500 TABLET ORAL at 09:05

## 2019-09-16 RX ADMIN — METOPROLOL TARTRATE 25 MG: 25 TABLET ORAL at 09:06

## 2019-09-16 RX ADMIN — Medication 400 MG: at 17:26

## 2019-09-16 RX ADMIN — MIDODRINE HYDROCHLORIDE 5 MG: 5 TABLET ORAL at 09:05

## 2019-09-16 RX ADMIN — MIDODRINE HYDROCHLORIDE 5 MG: 5 TABLET ORAL at 17:26

## 2019-09-16 RX ADMIN — WARFARIN SODIUM 2.5 MG: 2.5 TABLET ORAL at 17:26

## 2019-09-16 RX ADMIN — ACYCLOVIR 200 MG: 200 CAPSULE ORAL at 09:05

## 2019-09-16 RX ADMIN — METFORMIN HYDROCHLORIDE 1000 MG: 500 TABLET ORAL at 17:25

## 2019-09-16 RX ADMIN — DICLOFENAC 2 G: 10 GEL TOPICAL at 20:03

## 2019-09-16 RX ADMIN — CALCIUM 500 MG: 500 TABLET ORAL at 17:25

## 2019-09-16 RX ADMIN — SENNOSIDES, DOCUSATE SODIUM 2 TABLET: 50; 8.6 TABLET, FILM COATED ORAL at 09:05

## 2019-09-16 RX ADMIN — DIGOXIN 125 MCG: 125 TABLET ORAL at 09:05

## 2019-09-16 RX ADMIN — SIMVASTATIN 20 MG: 20 TABLET, FILM COATED ORAL at 20:00

## 2019-09-16 RX ADMIN — TAMSULOSIN HYDROCHLORIDE 0.4 MG: 0.4 CAPSULE ORAL at 09:05

## 2019-09-16 RX ADMIN — ASPIRIN 81 MG: 81 TABLET ORAL at 09:05

## 2019-09-16 RX ADMIN — Medication 400 MG: at 09:05

## 2019-09-16 ASSESSMENT — PAIN SCALES - GENERAL
PAINLEVEL_OUTOF10: 0

## 2019-09-16 NOTE — PROGRESS NOTES
Department of [facility] Progress Note    Patient Identification:  Perla Ann  [id number]  : 1932  Admit date: 2019      Diagnosis:   Patient Active Problem List   Diagnosis    CAD (coronary artery disease)    Hyperlipidemia    HTN (hypertension)    Nonrheumatic aortic valve stenosis    Non-rheumatic mitral regurgitation    Cardiomyopathy ([address]    Irregular heart beat    Chronic systolic congestive heart failure (HCC)    Nonrheumatic aortic valve stenosis    S/P TAVR (transcatheter aortic valve replacement)    Arthritis of both knees           Subjective: Pt seen this AM. Patient will be discussed this AM in [address] with entire Rehab Team of PT, OT, Speech, Dietary, Nursing, and Social Service about the progress made in Rehab Unit therapies, and when patient will be ready for discharge. We think patient will be ready for discharge to home this coming weekend on Saturday. He is doing better in therapy since his knee pain is improving with his new treatment of Celebrex and diclofenac gel. He is working on steps and PT, and needs contact-guard assistance for his ADLs. Patient's blood sugars are under control. Repeat labs continue to show a low mag level, and he is receiving mag supplementation at this time. Patient does live alone.     BP (!) 154/81   Pulse 73   Temp 98.3 °F (36.8 °C) (Oral)   Resp 17   Ht 6' (1.829 m)   Wt 197 lb 1.5 oz (89.4 kg)   SpO2 95%   BMI 26.73 kg/m²     Last 24 hour lab  Recent Results (from the past 24 hour(s))   POCT Glucose    Collection Time: 09/15/19  7:37 AM   Result Value Ref Range    POC Glucose 132 (H) 70 - 99 mg/dl    Performed on ACCU-CHEK    Protime-INR    Collection Time: 09/15/19  9:23 AM   Result Value Ref Range    Protime 30.2 (H) 9.8 - 13.0 sec    INR 2.65 (H) 0.86 - 1.14   POCT Glucose    Collection Time: 09/15/19 12:09 PM   Result Value Ref Range    POC Glucose 202 (H) 70 - 99 mg/dl Performed on ACCU-CHEK    POCT Glucose    Collection Time: 09/15/19  5:11 PM   Result Value Ref Range    POC Glucose 138 (H) 70 - 99 mg/dl    Performed on ACCU-CHEK    POCT Glucose    Collection Time: 09/15/19  8:25 PM   Result Value Ref Range    POC Glucose 154 (H) 70 - 99 mg/dl    Performed on ACCU-CHEK        Therapy progress:  PT     Objective     Sit to Stand: Contact guard assistance  Stand to sit: Contact guard assistance  Bed to Chair: Contact guard assistance, Stand by assistance(turning and backing up with wheeled walker)  Device: Rolling Walker  Assistance: Contact guard assistance  Distance: 60'x2, 165' outdoors on uneven surfaces, shorter distances in gym with multiple turns to access curb and steps  OT  PT Equipment Recommendations  Equipment Needed: No  Other: Pt states he has RW and cane but only uses them when he wants to. Toilet - Technique: Ambulating  Equipment Used: Grab bars  Toilet Transfers Comments: Does not transfer but stands in front of toilet to urinate. Rec using RW and holding onto nearby sink for this activity at  home                    Assessment and Plan:     Bilateral knee arthritis-start Celebrex, diclofenac gel.     Multiple myeloma-chemotherapy on hold, Dr. Soheila Charlton. Fib- Coumadin     Diabetes - glipizide, metformin     Hyperlipidemia- Zocor     CAD with stent placement, CHF- ASA, Lanoxin     Hypertension- Lopressor     Gout- allopurinol     Bowels: Schedule Miralax + Senna S. Follow bowel movements. Enema or suppository if needed.      Bladder: Check PVR x 3.   [address] if PVR > 200ml or if any volume is > 500 ml.      Pain: Tylenol is ordered prn.          Melissa Villegas MD, 9/16/2019, 7:23 AM

## 2019-09-16 NOTE — PROGRESS NOTES
hypertension. \"  Response To Previous Treatment: Patient with no complaints from previous session. Family / Caregiver Present: No  Referring Practitioner: Dr William Galindo: Pt reports he had some discomfort in the back of his left knee while trying to sleep but the pain is resolved at the moment. Pt agreeable to therapy. General Comment  Comments: Pt fell twice in the last month. Was not using walker at time of falls. Orientation  Orientation  Overall Orientation Status: Within Functional Limits  Cognition      Objective      Transfers  Sit to Stand: Contact guard assistance  Stand to sit: Contact guard assistance  Comment: Pt required frequent cues to stay inside base of walker during transfers today. bilateral knees remain stiff, but patient is able to accommodate sit to stand by using UEs effectively  Ambulation  Ambulation?: Yes  Ambulation 1  Surface: level tile  Device: Rolling Walker  Assistance: Contact guard assistance  Quality of Gait: knees remain flexed throughout ambulation, but lacking 15-20* extension even passively, forward flexion of hips and low back during ambulation, today pt dragging feet  Gait Deviations: Slow Jeri;Decreased step height  Distance: 150' x2 wtih rest break between, multiple short ambulations with turns in gym  Comments: Pt required frequent reminders to clear feet. Examined pt's shoes and found they had poor . Changed pt to yellow  socks. Stairs/Curb  Stairs?: Yes  Stairs  # Steps : 12  Stairs Height: 6\"  Rails: Bilateral  Curbs: 6\"  Device: No Device;Rolling walker(RW for curb)  Assistance: Contact guard assistance  Comment: Pt initially attempted to use reciprical gait pattern but struggled to maintain balance. Had pt ascend with right and descend with left which allowed pt to control both balance with only CGA.          Exercises  Hip Flexion: seated marches x 10 bilaterally  Knee Long Arc Quad: seated extension x 10

## 2019-09-16 NOTE — PROGRESS NOTES
Clinical Pharmacy Note  Warfarin Consult    Fatuma Mendez is a 80 y.o. male receiving warfarin managed by pharmacy. Warfarin Indication: A-fib  [facility] INR range: 2-3   Dose prior to admission: 2.5mg daily except 5mg Monday, Wednesday and Fridays    Current warfarin drug-drug interactions: none of significance    Recent Labs     09/14/19  0828 09/15/19  0923 09/16/19  0709   HGB  --   --  11.4*   HCT  --   --  33.4*   INR 2.63* 2.65* 2.73*       Assessment/Plan:    Warfarin 2.5 mg tonight and tomorrow night. Will skip drawing INR tomorrow, restart daily INR on Wednesday 9/18. Thank you for the consult. Will continue to follow. HERNESTO TAMAYO, PHARM. D  9/16/2019  12:57 PM

## 2019-09-17 LAB
GLUCOSE BLD-MCNC: 112 MG/DL (ref 70–99)
GLUCOSE BLD-MCNC: 142 MG/DL (ref 70–99)
GLUCOSE BLD-MCNC: 156 MG/DL (ref 70–99)
GLUCOSE BLD-MCNC: 174 MG/DL (ref 70–99)
PERFORMED ON: ABNORMAL

## 2019-09-17 PROCEDURE — 97530 THERAPEUTIC ACTIVITIES: CPT | Performed by: PHYSICAL THERAPIST

## 2019-09-17 PROCEDURE — 97110 THERAPEUTIC EXERCISES: CPT | Performed by: PHYSICAL THERAPIST

## 2019-09-17 PROCEDURE — 97116 GAIT TRAINING THERAPY: CPT | Performed by: PHYSICAL THERAPIST

## 2019-09-17 PROCEDURE — 51798 US URINE CAPACITY MEASURE: CPT

## 2019-09-17 PROCEDURE — 97530 THERAPEUTIC ACTIVITIES: CPT

## 2019-09-17 PROCEDURE — 97110 THERAPEUTIC EXERCISES: CPT

## 2019-09-17 PROCEDURE — 94760 N-INVAS EAR/PLS OXIMETRY 1: CPT

## 2019-09-17 PROCEDURE — 97535 SELF CARE MNGMENT TRAINING: CPT

## 2019-09-17 PROCEDURE — 6370000000 HC RX 637 (ALT 250 FOR IP): Performed by: PHARMACIST

## 2019-09-17 PROCEDURE — 6370000000 HC RX 637 (ALT 250 FOR IP): Performed by: PHYSICAL MEDICINE & REHABILITATION

## 2019-09-17 PROCEDURE — 1280000000 HC REHAB R&B

## 2019-09-17 RX ADMIN — Medication 400 MG: at 21:09

## 2019-09-17 RX ADMIN — Medication 400 MG: at 13:00

## 2019-09-17 RX ADMIN — MIDODRINE HYDROCHLORIDE 5 MG: 5 TABLET ORAL at 13:00

## 2019-09-17 RX ADMIN — METOPROLOL TARTRATE 25 MG: 25 TABLET ORAL at 09:51

## 2019-09-17 RX ADMIN — METFORMIN HYDROCHLORIDE 1000 MG: 500 TABLET ORAL at 09:50

## 2019-09-17 RX ADMIN — DICLOFENAC 2 G: 10 GEL TOPICAL at 21:10

## 2019-09-17 RX ADMIN — DICLOFENAC 2 G: 10 GEL TOPICAL at 13:01

## 2019-09-17 RX ADMIN — SIMVASTATIN 20 MG: 20 TABLET, FILM COATED ORAL at 21:09

## 2019-09-17 RX ADMIN — ACYCLOVIR 200 MG: 200 CAPSULE ORAL at 21:09

## 2019-09-17 RX ADMIN — WARFARIN SODIUM 2.5 MG: 2.5 TABLET ORAL at 17:18

## 2019-09-17 RX ADMIN — DIGOXIN 125 MCG: 125 TABLET ORAL at 09:51

## 2019-09-17 RX ADMIN — Medication 400 MG: at 09:51

## 2019-09-17 RX ADMIN — Medication 400 MG: at 16:49

## 2019-09-17 RX ADMIN — CELECOXIB 200 MG: 200 CAPSULE ORAL at 09:50

## 2019-09-17 RX ADMIN — METFORMIN HYDROCHLORIDE 1000 MG: 500 TABLET ORAL at 16:49

## 2019-09-17 RX ADMIN — DICLOFENAC 2 G: 10 GEL TOPICAL at 09:59

## 2019-09-17 RX ADMIN — CALCIUM 500 MG: 500 TABLET ORAL at 09:51

## 2019-09-17 RX ADMIN — GLIPIZIDE 2.5 MG: 5 TABLET ORAL at 09:50

## 2019-09-17 RX ADMIN — TAMSULOSIN HYDROCHLORIDE 0.4 MG: 0.4 CAPSULE ORAL at 09:51

## 2019-09-17 RX ADMIN — MIDODRINE HYDROCHLORIDE 5 MG: 5 TABLET ORAL at 17:05

## 2019-09-17 RX ADMIN — MIDODRINE HYDROCHLORIDE 5 MG: 5 TABLET ORAL at 09:51

## 2019-09-17 RX ADMIN — ACYCLOVIR 200 MG: 200 CAPSULE ORAL at 09:51

## 2019-09-17 RX ADMIN — ASPIRIN 81 MG: 81 TABLET ORAL at 09:51

## 2019-09-17 RX ADMIN — CALCIUM 500 MG: 500 TABLET ORAL at 16:49

## 2019-09-17 RX ADMIN — ALLOPURINOL 100 MG: 100 TABLET ORAL at 09:51

## 2019-09-17 ASSESSMENT — PAIN SCALES - GENERAL
PAINLEVEL_OUTOF10: 0
PAINLEVEL_OUTOF10: 0

## 2019-09-17 NOTE — PROGRESS NOTES
Risk  Required Braces or Orthoses?: No  Subjective   General  Chart Reviewed: Progress Notes  Patient assessed for rehabilitation services?: Yes  Additional Pertinent Hx:  Patient is an 35-year-old male who came in to the hospital after having frequent falls at home. His legs gave out on him and he fell, hitting the ground with both knees. He lives at home alone. He does have a history of low blood pressure and is taking midodrine for this. He also has a history of multiple myoloma. Workup in the hospital revealed arthritis involving his knees. He is also known to have a history of A. Fib for which he takes Coumadin, diabetes, and hyperlipidemia. He also has CAD with previous stent placement, CHF, and previous hypertension. He was started in therapies, but was noted to be very weak,and not safe to return home alone. He is now admitted to our rehabilitation unit to work on improving his leg strength, transfers, gait, self-care activities before discharge. He does have help at home from his daughter, and she lives nearby. (copied per note Dr Margot Hodgkins 9/1/19)  Response to previous treatment: Patient with no complaints from previous session  Family / Caregiver Present: No  Referring Practitioner: Logan  Diagnosis: debility, bilateral knee pain  Subjective  Subjective: pt met bedside, resting in bed.   Stated he talked to Dr Margot Hodgkins this AM and decided to move discharge up to Thursday since he is doing so well         Orientation  Orientation  Overall Orientation Status: Within Functional Limits  Objective    ADL  Grooming: Independent(seated in chair at sink)  UE Bathing: Setup  LE Bathing: Stand by assistance(completed DECLAN hose, pants, underwear, shoes per self)  UE Dressing: Stand by assistance  LE Dressing: Stand by assistance  Additional Comments: since discharge has been move up to Thursday, offered pt shower 2 days in a row or just on Weds, Pt chose to do sponge bath today and shower tomorrow before discharge

## 2019-09-17 NOTE — PROGRESS NOTES
Physical Therapy  Facility/Department: [address] IP REHAB  Daily Treatment Note  NAME: Khushboo Rosa  : 1932  MRN: [id number]    Date of Service: 2019    Discharge Recommendations:  Continue to assess pending progress, 5-7 sessions per week   PT Equipment Recommendations  Equipment Needed: No  Other: Pt states he has RW and cane but only uses them when he wants to. Assessment   Body structures, Functions, Activity limitations: Decreased functional mobility ; Decreased strength;Decreased ROM; Decreased safe awareness;Decreased endurance;Decreased balance; Increased Pain  Assessment: Today pt able to perform transfers with CG/SBA, and ambulation community distances with wheeled walker and SBA. Pt ascended/descend 12 steps and curb with CGA. Pain levels much more controlled, but continues to demonstrate more weakness in L hip and knee compared to R. Pt would continue to benefit from skilled physical therapy on inpatient rehab to address mobility deficits, strength deficits, difficulty with balance, and difficulty with transfers. Anticipate D/C home . Treatment Diagnosis: impaired mobility  Prognosis: Good  PT Education: Goals;Transfer Training;General Safety; Adaptive Device Training;Gait Training;Functional Mobility Training;Home Exercise Program  Patient Education: Educated on need to move LEs to decrease pain and stiffness as well as to improve strength. REQUIRES PT FOLLOW UP: Yes  Activity Tolerance  Activity Tolerance: Patient limited by fatigue;Patient limited by endurance; Patient Tolerated treatment well  Activity Tolerance: Pt initially was very tired but tolerance improved throughout session. Patient Diagnosis(es): There were no encounter diagnoses.      has a past medical history of A-fib [facility]), Aortic stenosis, Atrial fibrillation ([facility] [address] Biclonal gammopathy, BPH (benign prostatic hyperplasia), CAD (coronary artery disease), [facility] Cataracts, bilateral, Diabetes, HTN

## 2019-09-17 NOTE — PROGRESS NOTES
excessive thirst, fluid intake, or urination. · Hematologic/Lymphatic: No abnormal bruising or ecchymoses, blood clots or swollen lymph nodes. · Allergic/Immunologic: No nasal congestion or hives. ·     Objective:     Patient Vitals for the past 8 hrs:   BP Temp Temp src Pulse Resp SpO2 Weight   09/17/19 0950 (!) 91/51 -- -- 87 -- -- --   09/17/19 0749 -- -- -- -- 18 95 % --   09/17/19 0450 130/73 98.1 °F (36.7 °C) Oral 66 16 96 % 197 lb 5 oz (89.5 kg)     I/O last 3 completed shifts:   In: 300 [P.O.:300]  Out: -   I/O this shift:  In: 240 [P.O.:240]  Out: -     BP (!) 91/51   Pulse 87   Temp 98.1 °F (36.7 °C) (Oral)   Resp 18   Ht 6' (1.829 m)   Wt 197 lb 5 oz (89.5 kg)   SpO2 95%   BMI 26.76 kg/m²     General Appearance:    Alert, cooperative, no distress, appears stated age   Head:    Normocephalic, without obvious abnormality, atraumatic   Eyes:    PERRL, conjunctiva/corneas clear, EOM's intact, fundi     benign, both eyes        Ears:    Normal TM's and external ear canals, both ears   Nose:   Nares normal, septum midline, mucosa normal, no drainage    or sinus tenderness   Throat:   Lips, mucosa, and tongue normal; teeth and gums normal   Neck:   Supple, symmetrical, trachea midline, no adenopathy;        thyroid:  No enlargement/tenderness/nodules; no carotid    bruit or JVD   Back:     Symmetric, no curvature, ROM normal, no CVA tenderness   Lungs:     Clear to auscultation bilaterally, respirations unlabored   Chest wall:    No tenderness or deformity   Heart:    Regular rate and rhythm, S1 and S2 normal, no murmur, rub   or gallop   Abdomen:     Soft, non-tender, bowel sounds active all four quadrants,     no masses, no organomegaly           Extremities:   Extremities normal, atraumatic, no cyanosis or edema   Pulses:   2+ and symmetric all extremities   Skin:   Skin color, texture, turgor normal, no rashes or lesions   Lymph nodes:   Cervical, supraclavicular, and axillary nodes normal

## 2019-09-17 NOTE — PROGRESS NOTES
99 mg/dl    Performed on ACCU-CHEK    POCT Glucose    Collection Time: 09/16/19  8:23 PM   Result Value Ref Range    POC Glucose 128 (H) 70 - 99 mg/dl    Performed on ACCU-CHEK    POCT Glucose    Collection Time: 09/17/19  7:53 AM   Result Value Ref Range    POC Glucose 142 (H) 70 - 99 mg/dl    Performed on ACCU-CHEK        Therapy progress:  PT     Objective     Sit to Stand: Contact guard assistance  Stand to sit: Contact guard assistance  Bed to Chair: Contact guard assistance, Stand by assistance(turning and backing up with wheeled walker)  Device: Rolling Walker  Assistance: Contact guard assistance  Distance: 150' x2 wtih rest break between, multiple short ambulations with turns in gym  OT  PT Equipment Recommendations  Equipment Needed: No  Other: Pt states he has RW and cane but only uses them when he wants to. Toilet - Technique: Ambulating  Equipment Used: Grab bars  Toilet Transfers Comments: Does not transfer but stands in front of toilet to urinate. Rec using RW and holding onto nearby sink for this activity at  home                    Assessment and Plan:     Bilateral knee arthritis-start Celebrex, diclofenac gel.     Multiple myeloma-chemotherapy on hold, Dr. Violette Dueñas. Fib- Coumadin     Diabetes - glipizide, metformin     Hyperlipidemia- Zocor     CAD with stent placement, CHF- ASA, Lanoxin     Hypertension- Lopressor     Gout- allopurinol     Bowels: Schedule Miralax + Senna S. Follow bowel movements. Enema or suppository if needed.      Bladder: Check PVR x 3.   [address] if PVR > 200ml or if any volume is > 500 ml.      Pain: Tylenol is ordered prn.          Julia Kaufman MD, 9/17/2019, 9:00 AM

## 2019-09-18 LAB
ANION GAP SERPL CALCULATED.3IONS-SCNC: 14 MMOL/L (ref 3–16)
BUN BLDV-MCNC: 14 MG/DL (ref 7–20)
CALCIUM SERPL-MCNC: 9.6 MG/DL (ref 8.3–10.6)
CHLORIDE BLD-SCNC: 99 MMOL/L (ref 99–110)
CO2: 27 MMOL/L (ref 21–32)
CREAT SERPL-MCNC: 0.9 MG/DL (ref 0.8–1.3)
GFR AFRICAN AMERICAN: >60
GFR NON-AFRICAN AMERICAN: >60
GLUCOSE BLD-MCNC: 111 MG/DL (ref 70–99)
GLUCOSE BLD-MCNC: 135 MG/DL (ref 70–99)
GLUCOSE BLD-MCNC: 140 MG/DL (ref 70–99)
GLUCOSE BLD-MCNC: 144 MG/DL (ref 70–99)
GLUCOSE BLD-MCNC: 150 MG/DL (ref 70–99)
HCT VFR BLD CALC: 33.3 % (ref 40.5–52.5)
HEMOGLOBIN: 11.4 G/DL (ref 13.5–17.5)
INR BLD: 3.31 (ref 0.86–1.14)
MAGNESIUM: 1.7 MG/DL (ref 1.8–2.4)
MCH RBC QN AUTO: 33.7 PG (ref 26–34)
MCHC RBC AUTO-ENTMCNC: 34.1 G/DL (ref 31–36)
MCV RBC AUTO: 98.9 FL (ref 80–100)
PDW BLD-RTO: 14.9 % (ref 12.4–15.4)
PERFORMED ON: ABNORMAL
PLATELET # BLD: 263 K/UL (ref 135–450)
PMV BLD AUTO: 7.6 FL (ref 5–10.5)
POTASSIUM SERPL-SCNC: 4.1 MMOL/L (ref 3.5–5.1)
PROTHROMBIN TIME: 37.7 SEC (ref 9.8–13)
RBC # BLD: 3.37 M/UL (ref 4.2–5.9)
SODIUM BLD-SCNC: 140 MMOL/L (ref 136–145)
WBC # BLD: 6.3 K/UL (ref 4–11)

## 2019-09-18 PROCEDURE — 36415 COLL VENOUS BLD VENIPUNCTURE: CPT

## 2019-09-18 PROCEDURE — 83735 ASSAY OF MAGNESIUM: CPT

## 2019-09-18 PROCEDURE — 97535 SELF CARE MNGMENT TRAINING: CPT

## 2019-09-18 PROCEDURE — 6370000000 HC RX 637 (ALT 250 FOR IP): Performed by: PHYSICAL MEDICINE & REHABILITATION

## 2019-09-18 PROCEDURE — 80048 BASIC METABOLIC PNL TOTAL CA: CPT

## 2019-09-18 PROCEDURE — 97116 GAIT TRAINING THERAPY: CPT | Performed by: PHYSICAL THERAPIST

## 2019-09-18 PROCEDURE — 85610 PROTHROMBIN TIME: CPT

## 2019-09-18 PROCEDURE — 85027 COMPLETE CBC AUTOMATED: CPT

## 2019-09-18 PROCEDURE — 97530 THERAPEUTIC ACTIVITIES: CPT | Performed by: PHYSICAL THERAPIST

## 2019-09-18 PROCEDURE — 1280000000 HC REHAB R&B

## 2019-09-18 PROCEDURE — 94760 N-INVAS EAR/PLS OXIMETRY 1: CPT

## 2019-09-18 PROCEDURE — 97110 THERAPEUTIC EXERCISES: CPT | Performed by: PHYSICAL THERAPIST

## 2019-09-18 PROCEDURE — 97530 THERAPEUTIC ACTIVITIES: CPT

## 2019-09-18 RX ORDER — TAMSULOSIN HYDROCHLORIDE 0.4 MG/1
0.4 CAPSULE ORAL DAILY
Qty: 30 CAPSULE | Refills: 3 | Status: SHIPPED | OUTPATIENT
Start: 2019-09-19

## 2019-09-18 RX ORDER — LANOLIN ALCOHOL/MO/W.PET/CERES
400 CREAM (GRAM) TOPICAL 3 TIMES DAILY
Qty: 90 TABLET | Refills: 3 | Status: SHIPPED | OUTPATIENT
Start: 2019-09-18

## 2019-09-18 RX ORDER — CELECOXIB 200 MG/1
200 CAPSULE ORAL DAILY
Qty: 60 CAPSULE | Refills: 3 | Status: SHIPPED | OUTPATIENT
Start: 2019-09-19

## 2019-09-18 RX ADMIN — MIDODRINE HYDROCHLORIDE 5 MG: 5 TABLET ORAL at 12:13

## 2019-09-18 RX ADMIN — METOPROLOL TARTRATE 25 MG: 25 TABLET ORAL at 08:12

## 2019-09-18 RX ADMIN — Medication 400 MG: at 21:40

## 2019-09-18 RX ADMIN — CELECOXIB 200 MG: 200 CAPSULE ORAL at 08:14

## 2019-09-18 RX ADMIN — DICLOFENAC 2 G: 10 GEL TOPICAL at 08:19

## 2019-09-18 RX ADMIN — METFORMIN HYDROCHLORIDE 1000 MG: 500 TABLET ORAL at 08:14

## 2019-09-18 RX ADMIN — TAMSULOSIN HYDROCHLORIDE 0.4 MG: 0.4 CAPSULE ORAL at 08:14

## 2019-09-18 RX ADMIN — SIMVASTATIN 20 MG: 20 TABLET, FILM COATED ORAL at 21:40

## 2019-09-18 RX ADMIN — ACYCLOVIR 200 MG: 200 CAPSULE ORAL at 08:13

## 2019-09-18 RX ADMIN — CALCIUM 500 MG: 500 TABLET ORAL at 17:07

## 2019-09-18 RX ADMIN — MIDODRINE HYDROCHLORIDE 5 MG: 5 TABLET ORAL at 08:14

## 2019-09-18 RX ADMIN — ASPIRIN 81 MG: 81 TABLET ORAL at 08:12

## 2019-09-18 RX ADMIN — DIGOXIN 125 MCG: 125 TABLET ORAL at 08:13

## 2019-09-18 RX ADMIN — ALLOPURINOL 100 MG: 100 TABLET ORAL at 08:13

## 2019-09-18 RX ADMIN — CALCIUM 500 MG: 500 TABLET ORAL at 08:14

## 2019-09-18 RX ADMIN — Medication 400 MG: at 12:13

## 2019-09-18 RX ADMIN — METFORMIN HYDROCHLORIDE 1000 MG: 500 TABLET ORAL at 17:07

## 2019-09-18 RX ADMIN — DICLOFENAC 2 G: 10 GEL TOPICAL at 21:40

## 2019-09-18 RX ADMIN — POLYETHYLENE GLYCOL 3350 17 G: 17 POWDER, FOR SOLUTION ORAL at 08:14

## 2019-09-18 RX ADMIN — GLIPIZIDE 2.5 MG: 5 TABLET ORAL at 08:13

## 2019-09-18 RX ADMIN — Medication 400 MG: at 08:14

## 2019-09-18 RX ADMIN — DICLOFENAC 2 G: 10 GEL TOPICAL at 15:16

## 2019-09-18 RX ADMIN — ACYCLOVIR 200 MG: 200 CAPSULE ORAL at 21:40

## 2019-09-18 RX ADMIN — Medication 400 MG: at 17:07

## 2019-09-18 RX ADMIN — MIDODRINE HYDROCHLORIDE 5 MG: 5 TABLET ORAL at 17:07

## 2019-09-18 ASSESSMENT — PAIN SCALES - GENERAL
PAINLEVEL_OUTOF10: 0
PAINLEVEL_OUTOF10: 0

## 2019-09-18 NOTE — PROGRESS NOTES
replacement. Restrictions  Restrictions/Precautions  Restrictions/Precautions: Fall Risk  Required Braces or Orthoses?: No  Subjective   General  Chart Reviewed: Progress Notes  Patient assessed for rehabilitation services?: Yes  Additional Pertinent Hx:  Patient is an 70-year-old male who came in to the hospital after having frequent falls at home. His legs gave out on him and he fell, hitting the ground with both knees. He lives at home alone. He does have a history of low blood pressure and is taking midodrine for this. He also has a history of multiple myoloma. Workup in the hospital revealed arthritis involving his knees. He is also known to have a history of A. Fib for which he takes Coumadin, diabetes, and hyperlipidemia. He also has CAD with previous stent placement, CHF, and previous hypertension. He was started in therapies, but was noted to be very weak,and not safe to return home alone. He is now admitted to our rehabilitation unit to work on improving his leg strength, transfers, gait, self-care activities before discharge. He does have help at home from his daughter, and she lives nearby. (copied per note Dr Johnny Sherman 9/1/19)  Response to previous treatment: Patient with no complaints from previous session  Family / Caregiver Present: No  Referring Practitioner: Abdulazizis  Diagnosis: debility, bilateral knee pain  Subjective  Subjective: pt met bedside, resting in bed, agreeable to OT and shower  General Comment  Comments: Pt deniies dizziness or lightheadedness throughout seesion buths BP of 85/58 sittingand 77/46 standing (after 2 minutes) . Nursing notified. Orientation  Orientation  Overall Orientation Status: Within Functional Limits  Objective    ADL  Grooming: Independent(brushed teeth, combed hair seated.   He did need encouragement to brush teeth - stated he sometimes only brushes them at night)  UE Bathing: Independent  LE Bathing: Modified independent (seated on shower chair, grab bar Daughter stated he could place microwave on table, he could then use a bag on walker to get food from cabinet or refrigerator to table. Pt returned to room per transport at end of session       Plan   Plan  Times per week: 5-6 days per week  Times per day: Twice a day  Plan weeks: 2 weeks  Current Treatment Recommendations: Strengthening, Balance Training, Endurance Training, Patient/Caregiver Education & Training, Home Management Training, Functional Mobility Training, Safety Education & Training, Equipment Evaluation, Education, & procurement, Self-Care / ADL               Goals  Short term goals  Time Frame for Short term goals: 1 week pt will. Short term goal 1: bathe with SBA and AD Goal Met  Short term goal 2: dress LB with min and AD  Goal Met  Short term goal 3: toilet with CGA and AD  Goal Met  Short term goal 4: transfer with CGA and AD  Goal Met  Short term goal 5: functional mobilitiy and simple meal prep with CGA and AD  Goal Met  Short term goal 6: increase activity tolerance to stand 5 min for ADl/IADL tasks  Goal Met  Long term goals  Time Frame for Long term goals : 2 weeks pt will. .  Long term goal 1: bathe indep with AD  Goal Met  Long term goal 2: dress indep with AD Goal Met  Long term goal 3: toilet indep with AD  Goal Met  Long term goal 4: transfer indep with AD Goal Met  Long term goal 5: functional mobility and simple meal prep indep with AD Goal Met  Patient Goals   Patient goals : \"I want to be able to walk, get back to my house, be able to take care of myself       Therapy Time   Individual Concurrent Group Co-treatment   Time In 0915         Time Out 1000         Minutes 45         Timed Code Treatment Minutes: 45 Minutes     Therapy Time     Individual Co-treatment   Time In 1430     Time Out 1515     Minutes 939 Wanda Ramos, OTR/L 770

## 2019-09-18 NOTE — DISCHARGE INSTR - COC
Continuity of Care Form    Patient Name: Jin Wilhelm   :  1932  MRN:  [id number]    Admit date:  2019  Discharge date:  2019    Code Status Order: Full Code   Advance Directives:   Advance Care Flowsheet Documentation     Date/Time Healthcare Directive Type of Healthcare Directive Copy in [address] Agent's Name Healthcare Agent's Phone Number    19 [phone]  Yes, patient has an advance directive for healthcare treatment  Durable power of  for health care;Living will  No, copy requested from family  --  --  --    19 1324  Yes, patient has an advance directive for healthcare treatment  Durable power of  for health care;Living will  No, copy requested from family  Adult Children patient said his daughter Farida Horton is HC-POA  --  --    19 1502  No, patient does not have an advance directive for healthcare treatment  --  --  --  --  --          Admitting Physician:  Niranjan Meyer MD  PCP: [facility]    Discharging Nurse: [facility] Unit/Room#: B6U-9834/3275-01  Discharging Unit Phone Number: [phone]    Emergency Contact:   Extended Emergency Contact Information  Primary Emergency Contact: Theresa [address] Phone: [phone]  Relation: Child  Secondary Emergency Contact: Zahra Hahn  Address: [place] [address] Phone: [phone]  Relation: Other    Past Surgical History:  Past Surgical History:   Procedure Laterality Date    APPENDECTOMY  196    CARDIAC VALVE REPLACEMENT      CARPAL TUNNEL RELEASE Right     2009    CATARACT REMOVAL Bilateral 2010    CHOLECYSTECTOMY      CORONARY ANGIOPLASTY WITH STENT PLACEMENT  2008    mid RCA, BMS    CYST REMOVAL  2003    Lumbar region   [address] Bilateral 1970's       Immunization History:      There is no

## 2019-09-18 NOTE — PLAN OF CARE
Problem: Falls - Risk of:  Goal: Will remain free from falls  Description  Will remain free from falls  2019 1217 by Jacquelin Pacheco RN  Outcome: Ongoing  Note:   [address] RECORD NUMBER:  [id number]  AGE: 80 y.o. GENDER: male  : 1932  TODAYS DATE:  2019    Assessment     Previous Falls: Yes prior to admission     Patient Alert: yes  Patient Oriented: oriented in all spheres    Level of Assistance: Moderate Assist  Device in Use: Rolling Walker and gait belt     [place] Score: Score: 65     in Use: N/A     Prevention Plan     Nursing Actions Taken: Tee Gracia all that apply)    ? Belongings in Reach according to Patient Preference  ? Call Light within Reach and Patient Reminded to Call for Assistance  ? Shoes on Patient when OOB  ? Non-Skid Socks on Patient when in bed  ? Hourly Rounding Performed  ? Bed Alarm on Patient  ? Chair Alarm on Patient  ? Other:    Bed Rails in Use: 2 of 4    Patient Verbalized Understanding: Yes  Family Present: No    Electronically signed by Jacquelin Pacheco RN on 2019 at 12:17 PM     2019 0120 by Penny Jason RN  Outcome: Ongoing  Note:   Patient free from falls this shift. Fall precautions in place at all times. Bed in lowest position with two side rails up and wheels locked. Call light within reach. Patient able and agreeable to contact for safety appropriately. Goal: Absence of physical injury  Description  Absence of physical injury  2019 1217 by Jacquelin Pacheco RN  Outcome: Ongoing  2019 0120 by Penny Jason RN  Outcome: Ongoing     Problem: Risk for Impaired Skin Integrity  Goal: Tissue integrity - skin and mucous membranes  Description  Structural intactness and normal physiological function of skin and  mucous membranes. 2019 1217 by Jacquelin Pacheco RN  Outcome: Ongoing  Note:   SKIN MANAGEMENT    [address] RECORD NUMBER:  [id number]  AGE: 80 y.o.    GENDER: male
Problem: Falls - Risk of:  Goal: Will remain free from falls  Description  Will remain free from falls  9/13/2019 0134 by Olga Mccord RN  Outcome: Ongoing  Note:   Fall precautions in place. [place] score: 65. Pt transfers with walker/gaitbelt x1. Bed locked in lowest position, nonskid socks on, bed/chair alarm active. Call light and belongings within reach. Continue hourly rounding. Problem: Risk for Impaired Skin Integrity  Goal: Tissue integrity - skin and mucous membranes  Description  Structural intactness and normal physiological function of skin and  mucous membranes. 9/13/2019 0134 by Olga Mccord RN  Outcome: Ongoing  Note:   Skin assessment performed. Laith score of 19. Encouraging and assisted with repositioning as needed. Heels elevated while in chair/bed. Use of waffle cushion while up to chair. Problem: OXYGENATION/RESPIRATORY FUNCTION  Goal: Patient will maintain patent airway  9/13/2019 0134 by Olga Mccord RN  Outcome: Ongoing       Problem: ACTIVITY INTOLERANCE/IMPAIRED MOBILITY  Goal: Mobility/activity is maintained at optimum level for patient  9/13/2019 0134 by Olga Mccord RN  Outcome: Ongoing  Note:   Working with PT/OT for 3 hours/day to increase mobility and strength. Denies SOB or chest pain. VSS on room air.
Problem: Falls - Risk of:  Goal: Will remain free from falls  Description  Will remain free from falls  9/16/2019 1225 by Rajat Carballo RN  Outcome: Ongoing  Note:   Yellow FALL RISK band on patient. Orange light on outside of room. Non skid footwear in place. Alarms used appropriately. Patient instructed to call and wait for staff before getting up. Rounding done to anticipate needs. Appropriate safety devices used for transfers      Problem: Falls - Risk of:  Goal: Absence of physical injury  Description  Absence of physical injury  9/16/2019 1225 by Rajat Carballo RN  Outcome: Ongoing  Note:   Pt is free of injury. No injury noted. Fall precautions in place. Call light within reach. Will monitor. Problem: Risk for Impaired Skin Integrity  Goal: Tissue integrity - skin and mucous membranes  Description  Structural intactness and normal physiological function of skin and  mucous membranes. 9/16/2019 1225 by Rajat Carballo RN  Outcome: Ongoing  Note:   Pt assessed for skin break down. Skin was warm and dry to touch. Pt able to turn self and regulate head of bed with assistance. Pt reminded to turn or reposition at least every 2 hours to prevent skin breakdown. Will continue to monitor and assess. Problem: OXYGENATION/RESPIRATORY FUNCTION  Goal: Patient will maintain patent airway  9/16/2019 1225 by Rajat Carballo RN  Outcome: Ongoing  Note:   Pt airway is patent. Will continue to assess and monitor. Problem: ACTIVITY INTOLERANCE/IMPAIRED MOBILITY  Goal: Mobility/activity is maintained at optimum level for patient  9/16/2019 1225 by Rajat Carballo RN  Outcome: Ongoing  Note:   Patient working with therapy at least 3 hours/day to obtain maximal mobility. Safety devices used.
Problem: Falls - Risk of:  Goal: Will remain free from falls  Description  Will remain free from falls  9/17/2019 1402 by Rajat Carballo RN  Outcome: Ongoing  Note:   Yellow FALL RISK band on patient. Orange light on outside of room. Non skid footwear in place. Alarms used appropriately. Patient instructed to call and wait for staff before getting up. Rounding done to anticipate needs. Appropriate safety devices used for transfers      Problem: Falls - Risk of:  Goal: Absence of physical injury  Description  Absence of physical injury  9/17/2019 1402 by Rajat Carballo RN  Outcome: Ongoing  Note:   Pt is free of injury. No injury noted. Fall precautions in place. Call light within reach. Will monitor. Problem: Risk for Impaired Skin Integrity  Goal: Tissue integrity - skin and mucous membranes  Description  Structural intactness and normal physiological function of skin and  mucous membranes. 9/17/2019 1402 by Rajat Carballo RN  Outcome: Ongoing  Note:   No new skin issues. Problem: OXYGENATION/RESPIRATORY FUNCTION  Goal: Patient will maintain patent airway  9/17/2019 1402 by Rajat Carballo RN  Outcome: Ongoing  Note:   Pt airway is patent, respiratory rate and effort are within normal limits. Will continue to assess and monitor. Problem: ACTIVITY INTOLERANCE/IMPAIRED MOBILITY  Goal: Mobility/activity is maintained at optimum level for patient  9/17/2019 1402 by Rajat Carballo RN  Outcome: Ongoing  Note:   Patient working with therapy at least 3 hours/day to obtain maximal mobility. Safety devices used. Problem: IP BLADDER/VOIDING  Goal: STG - Patient demonstrates no accidents  9/17/2019 1402 by Rajat Carballo RN  Outcome: Ongoing  Note:   No accidents this shift.
Problem: Falls - Risk of:  Goal: Will remain free from falls  Description  Will remain free from falls  Outcome: Ongoing  Note:   [address] RECORD NUMBER:  [id number]  AGE: 80 y.o. GENDER: male  : 1932  TODAYS DATE:  9/15/2019    Assessment     Previous Falls: Yes prior to admission     Patient Alert: yes  Patient Oriented: oriented in all spheres    Level of Assistance: Moderate Assist  Device in Use: Walker and gait belt     [place] Score: Score: 65     in Use: N/A     Prevention Plan     Nursing Actions Taken: Alicia Brandie all that apply)    ? Belongings in Reach according to Patient Preference  ? Call Light within Reach and Patient Reminded to Call for Assistance  ? Shoes on Patient  ? Non-Skid Socks on Patient  ? Hourly Rounding Performed  ? Bed Alarm on Patient  ? Chair Alarm on Patient  ? Other:    Bed Rails in Use: 2 of 4    Patient Verbalized Understanding: Yes  Family Present: No    Electronically signed by Solange Tang RN on 9/15/2019 at 12:10 PM     Goal: Absence of physical injury  Description  Absence of physical injury  Outcome: Ongoing     Problem: Risk for Impaired Skin Integrity  Goal: Tissue integrity - skin and mucous membranes  Description  Structural intactness and normal physiological function of skin and  mucous membranes. Outcome: Ongoing  Note:   SKIN MANAGEMENT    Leonid Mitchell  MEDICAL RECORD NUMBER:  [id number]  AGE: 80 y.o. GENDER: male  : 1932  TODAYS DATE:  9/15/2019    Assessment     Skin Assessment: Dry and Warm    Surgical Incision: No    Current Breakdown: No    Patient able to Turn Self: yes    Laith Risk Score: Laith Scale Score: 19    Patient Incontinent: no     Prevention Plan     Nursing Actions Taken:( Sarthak all that apply)    ? Waffle Cushion  ? Roho Cushion  ? Turn Every 2 Hours  ? Specialty Mattress  ? Barrier Cream  ? Lotion  ? Skin Fold Management: ? Powder     ? Interdry     ? Other:   ?
Problem: Falls - Risk of:  Goal: Will remain free from falls  Description  Will remain free from falls  Outcome: Ongoing  Pt uses call light appropriately. While in bed side rails up 3/4. Call light in reach. Non skid footwear in use Alarm on. While in Chair, call light in reach, non skid footwear and alarms on. Problem: Risk for Impaired Skin Integrity  Goal: Tissue integrity - skin and mucous membranes  Description  Structural intactness and normal physiological function of skin and  mucous membranes. Outcome: Ongoing   Patient able to reposition self in bed. Encouraged to do so at a minimum of every two hours. Will continue to monitor. Patient repositions himself in chair as needed. Problem: OXYGENATION/RESPIRATORY FUNCTION  Goal: Patient will maintain patent airway  Outcome: Ongoing     Problem: HEMODYNAMIC STATUS  Goal: Patient has stable vital signs and fluid balance  Outcome: Ongoing     Problem: IP BLADDER/VOIDING  Goal: STG - Patient demonstrates no accidents  Outcome: Ongoing  Problem: NUTRITION  Description  Altered Nutrition and Hydration  Goal: Patient maintains adequate hydration  Outcome: Ongoing     Patient with poor appetite. Encouraged to drink and eat  Problem: PAIN  Goal: STG - pain is manageable through therapies  Outcome: Ongoing  Denies pain.
Ongoing  9/17/2019 2354 by Valentin Mcadams RN  Outcome: Ongoing     Problem: SKIN INTEGRITY  Goal: STG - patient will maintain good skin integrity  9/18/2019 1137 by Akua Rivero RN  Outcome: Ongoing  9/17/2019 2354 by Valentin Mcadams RN  Outcome: Ongoing     Problem: Nutrition  Goal: Optimal nutrition therapy  9/18/2019 1137 by Akua Rivero RN  Outcome: Ongoing  Note:   Supplement with meals.   9/18/2019 0932 by Irlanda Cabrera RD, LD  Outcome: Ongoing
needs   Nursing interventions may include bowel/bladder training, education for medical assistive devices, medication education, O2 saturation management, energy conservation, stress management techniques, fall prevention, alarms protocol, seating and positioning, skin/wound care, pressure relief instruction,dressing changes,  infection protection, DVT prophylaxis, and/or assistance with in room safety with transfers to bed, toilet, wheelchair, shower as well as bathroom activities and hygiene. Patient/caregiver education for:   [x] Disease/sustained injury/management      [x] Medication Use   [] Surgical intervention   [x] Safety   [x] Body mechanics and or joint protection   [x] Health maintenance         PHYSICAL THERAPY:  Goals:                  Short term goals  Time Frame for Short term goals: 5 days  Short term goal 1: Pt will perform bed mobility with MI. Short term goal 2: Pt will transfer with CGA. Short term goal 3: Pt will ambulate 48' with min assist with RW. Short term goal 4: Pt will ascend / descend curb step with RW and min assist.            Long term goals  Time Frame for Long term goals : 10 days  Long term goal 1: Pt will transfer with MI using RW. Long term goal 2: Pt will ambulate 150' with MI using RW. Long term goal 3: Pt will ascend / descend curb step with RW and CGA assist.  Long term goal 4: Pt will ascend / descend 4 steps with SUP using one rail. These goals were reviewed with this patient at the time of assessment and Katarina Duong is in agreement. Plan of Care: Pt to be seen 90   mins per day for 5-6 day/week 7-10 days.                    Current Treatment Recommendations: Strengthening, ROM, Balance Training, Functional Mobility Training, Transfer Training, Endurance Training, Gait Training, Stair training, Pain Management, Home Exercise Program, Safety Education & Training      OCCUPATIONAL THERAPY:  Goals:             Short term goals  Time Frame for Short term
pain is manageable through therapies  Outcome: Ongoing   Pt assessed for pain. Pt denies any pain at this time. Will continue to monitor pt and assess for pain throughout rest of shift.

## 2019-09-18 NOTE — PROGRESS NOTES
turning on water, but did complete without cues. Safety concerns, min decreased initiation for grooming    Bed mobility  Bridging: Independent  Rolling to Left: Independent  Rolling to Right: Independent  Supine to Sit: Independent  Sit to Supine: Independent  Scooting: Independent  Comment: returned to bed at end of therapy session to take a nap    Functional Transfers: Toilet Transfers  Toilet - Technique: Ambulating  Equipment Used: Standard bedside commode(over toilet as he will have at home)  Toilet Transfer: Modified independent         [address] - Transfer From: Shoaib Grater - Transfer Type: To and From  Shower - Transfer To: Shower seat with back  Shower - Technique: Ambulating  Shower Transfers: Modified independence  Shower Transfers Comments: In AM session, pt showered, PM session xstepped over simulated shower stall with ledge to shower chair indep using doorframe for support. DRY TRANSFER -            Functional Mobility  Functional - Mobility Device: Rolling Walker  Activity: To/from bathroom, Retrieve items, Transport items  Assist Level: Modified independent   Functional Mobility Comments: gathered items from closet for dressing, used walker appropriately. Instrumental ADL's  Instrumental ADLs: Yes  Meal Prep  Meal Prep Level: Walker  Meal Prep Level of Assistance: Supervision  Meal Preparation: pt gathered items to prepare can of soup on stovetop. cues for safe hand placement on stable surface while reaching and also to read front of stove for which burner to turn on. [place] He was able to stand up to 4 minutes at a time, rest break , then another 3 minutes to clean up. Discussed various methods to get food to table. Daughter stated he could place microwave on table, he could then use a bag on walker to get food from cabinet or refrigerator to table.   Light Housekeeping  Light Housekeeping Level: Amarise Moulding Housekeeping Level of Assistance: Supervision  Light Housekeeping:

## 2019-09-18 NOTE — PROGRESS NOTES
Department of [facility]  Dr. Kathy Dudley Progress Note    Patient Identification:  Vincent Alfonso  [id number]  : 1932  Admit date: 2019      Diagnosis:   Patient Active Problem List   Diagnosis    CAD (coronary artery disease)    Hyperlipidemia    HTN (hypertension)    Nonrheumatic aortic valve stenosis    Non-rheumatic mitral regurgitation    Cardiomyopathy ([address]    Irregular heart beat    Chronic systolic congestive heart failure (HCC)    Nonrheumatic aortic valve stenosis    S/P TAVR (transcatheter aortic valve replacement)    Arthritis of both knees           Subjective: Pt seen this AM. Patient is doing better in therapy since his knee pain is improving with his new treatment of Celebrex and diclofenac gel. He has made tremendous improvement in his therapies with his transfers, gait, and self-care activities. He is planning on discharge tomorrow to home with continued therapies, and he will have help from his daughter at discharge. Patient's blood sugars are under control. Repeat labs today show improvement in his mag level, and we will send him home with magnesium supplements. His INR is 3.31 today. Will fill out his RADHA and meds tday for his discharge tomorrow.     BP (!) 161/76   Pulse 73   Temp 97.5 °F (36.4 °C) (Oral)   Resp 18   Ht 6' (1.829 m)   Wt 197 lb 5 oz (89.5 kg)   SpO2 94%   BMI 26.76 kg/m²     Last 24 hour lab  Recent Results (from the past 24 hour(s))   POCT Glucose    Collection Time: 19  7:53 AM   Result Value Ref Range    POC Glucose 142 (H) 70 - 99 mg/dl    Performed on ACCU-CHEK    POCT Glucose    Collection Time: 19 12:05 PM   Result Value Ref Range    POC Glucose 174 (H) 70 - 99 mg/dl    Performed on ACCU-CHEK    POCT Glucose    Collection Time: 19  4:33 PM   Result Value Ref Range    POC Glucose 112 (H) 70 - 99 mg/dl    Performed on ACCU-CHEK    POCT Glucose    Collection Time: 19  8:49 PM   Result Value Ref Range    POC Glucose 156 (H) 70 - 99 mg/dl    Performed on ACCU-CHEK    Protime-INR    Collection Time: 09/18/19  7:00 AM   Result Value Ref Range    Protime 37.7 (H) 9.8 - 13.0 sec    INR 3.31 (H) 0.86 - 1.14   CBC    Collection Time: 09/18/19  7:00 AM   Result Value Ref Range    WBC 6.3 4.0 - 11.0 K/uL    RBC 3.37 (L) 4.20 - 5.90 M/uL    Hemoglobin 11.4 (L) 13.5 - 17.5 g/dL    Hematocrit 33.3 (L) 40.5 - 52.5 %    MCV 98.9 80.0 - 100.0 fL    MCH 33.7 26.0 - 34.0 pg    MCHC 34.1 31.0 - 36.0 g/dL    RDW 14.9 12.4 - 15.4 %    Platelets 944 091 - 267 K/uL    MPV 7.6 5.0 - 10.5 fL       Therapy progress:  PT     Objective     Sit to Stand: Contact guard assistance, Stand by assistance  Stand to sit: Stand by assistance  Bed to Chair: Contact guard assistance, Stand by assistance(turning and backing up with wheeled walker)  Device: Rolling Walker  Assistance: Contact guard assistance, Stand by assistance  Distance: 200' x2 wtih rest break between, multiple short ambulations with turns in gym  OT  PT Equipment Recommendations  Equipment Needed: No  Other: Pt states he has RW and cane but only uses them when he wants to. Toilet - Technique: Ambulating  Equipment Used: Grab bars  Toilet Transfers Comments: Does not transfer but stands in front of toilet to urinate. Rec using RW and holding onto nearby sink for this activity at  home                    Assessment and Plan:     Bilateral knee arthritis-start Celebrex, diclofenac gel.     Multiple myeloma-chemotherapy on hold, Dr. Rajat Mota. Fib- Coumadin     Diabetes - glipizide, metformin     Hyperlipidemia- Zocor     CAD with stent placement, CHF- ASA, Lanoxin     Hypertension- Lopressor     Gout- allopurinol     Bowels: Schedule Miralax + Senna S. Follow bowel movements. Enema or suppository if needed.      Bladder: Check PVR x 3.   [address] if PVR > 200ml or if any volume is > 500 ml.      Pain: Tylenol is ordered prn.          Noreen Sainz MD, 9/18/2019, 7:44 AM

## 2019-09-19 VITALS
TEMPERATURE: 98.1 F | DIASTOLIC BLOOD PRESSURE: 74 MMHG | HEART RATE: 79 BPM | OXYGEN SATURATION: 96 % | RESPIRATION RATE: 16 BRPM | BODY MASS INDEX: 26.7 KG/M2 | HEIGHT: 72 IN | SYSTOLIC BLOOD PRESSURE: 135 MMHG | WEIGHT: 197.09 LBS

## 2019-09-19 LAB
GLUCOSE BLD-MCNC: 128 MG/DL (ref 70–99)
INR BLD: 3.18 (ref 0.86–1.14)
PERFORMED ON: ABNORMAL
PROTHROMBIN TIME: 36.3 SEC (ref 9.8–13)

## 2019-09-19 PROCEDURE — 36415 COLL VENOUS BLD VENIPUNCTURE: CPT

## 2019-09-19 PROCEDURE — 85610 PROTHROMBIN TIME: CPT

## 2019-09-19 PROCEDURE — 6370000000 HC RX 637 (ALT 250 FOR IP): Performed by: PHYSICAL MEDICINE & REHABILITATION

## 2019-09-19 RX ADMIN — TAMSULOSIN HYDROCHLORIDE 0.4 MG: 0.4 CAPSULE ORAL at 09:02

## 2019-09-19 RX ADMIN — CELECOXIB 200 MG: 200 CAPSULE ORAL at 09:02

## 2019-09-19 RX ADMIN — METOPROLOL TARTRATE 25 MG: 25 TABLET ORAL at 09:02

## 2019-09-19 RX ADMIN — DICLOFENAC 2 G: 10 GEL TOPICAL at 09:04

## 2019-09-19 RX ADMIN — ASPIRIN 81 MG: 81 TABLET ORAL at 09:02

## 2019-09-19 RX ADMIN — GLIPIZIDE 2.5 MG: 5 TABLET ORAL at 09:02

## 2019-09-19 RX ADMIN — ALLOPURINOL 100 MG: 100 TABLET ORAL at 09:02

## 2019-09-19 RX ADMIN — MIDODRINE HYDROCHLORIDE 5 MG: 5 TABLET ORAL at 09:02

## 2019-09-19 RX ADMIN — METFORMIN HYDROCHLORIDE 1000 MG: 500 TABLET ORAL at 09:02

## 2019-09-19 RX ADMIN — Medication 400 MG: at 09:02

## 2019-09-19 RX ADMIN — CALCIUM 500 MG: 500 TABLET ORAL at 09:01

## 2019-09-19 RX ADMIN — ACYCLOVIR 200 MG: 200 CAPSULE ORAL at 09:02

## 2019-09-19 RX ADMIN — DIGOXIN 125 MCG: 125 TABLET ORAL at 09:02

## 2019-09-19 ASSESSMENT — PAIN SCALES - GENERAL: PAINLEVEL_OUTOF10: 0

## 2019-09-19 NOTE — PROGRESS NOTES
Data- discharge order received, pt verbalized agreement to discharge, disposition to previous residence, needs for HHC/DME. Action- discharge instructions prepared and given to patient, pt verbalized understanding. Medication information packet given r/t NEW and/or CHANGED prescriptions emphasizing name/purpose/side effects, pt verbalized understanding. Discharge instruction summary: Diet- carb control, Activity- up x 1 walker, [phone]/u appointment On [address] at 2:30 pm, immunizations reviewed, prescription medications filled. Inpatient surgical procedure precautions reviewed:  CHF Education reviewed. Pt/ Family has had a total of 60 minutes CHF education this admission encounter. Response- Pt belongings gathered, IV removed. Disposition is home ( HHC/DME needs), transported with belonging, taken to lobby via w/c, no complications.

## 2019-09-19 NOTE — PROGRESS NOTES
Range    Protime 36.3 (H) 9.8 - 13.0 sec    INR 3.18 (H) 0.86 - 1.14       Therapy progress:  PT     Objective     Sit to Stand: Modified independent  Stand to sit: Modified independent  Bed to Chair: Contact guard assistance, Stand by assistance(turning and backing up with wheeled walker)  Device: Rolling Walker  Assistance: Stand by assistance  Distance: 220' x 2 with rest breaks, multiple short distances in gym  OT  PT Equipment Recommendations  Equipment Needed: No  Other: Pt states he has RW and cane but only uses them when he wants to. Toilet - Technique: Ambulating  Equipment Used: Standard bedside commode(over toilet as he will have at home)  Toilet Transfers Comments: Does not transfer but stands in front of toilet to urinate. Rec using RW and holding onto nearby sink for this activity at  home                    Assessment and Plan:     Bilateral knee arthritis-start Celebrex, diclofenac gel.     Multiple myeloma-chemotherapy on hold, Dr. Gaby Frey. Fib- Coumadin     Diabetes - glipizide, metformin     Hyperlipidemia- Zocor     CAD with stent placement, CHF- ASA, Lanoxin     Hypertension- Lopressor     Gout- allopurinol     Bowels: Schedule Miralax + Senna S. Follow bowel movements. Enema or suppository if needed.      Bladder: Check PVR x 3.   [address] if PVR > 200ml or if any volume is > 500 ml.      Pain: Tylenol is ordered prn.          Oneil Boas, MD, 9/19/2019, 7:14 AM

## 2019-09-19 NOTE — DISCHARGE SUMMARY
[facility]  Dr. Dionisio Kyle Discharge Summary     Patient Identification:  Parul Angela  : 1932  Admit date: 2019  Discharge date: 19   Attending provider: Katia Zepeda MD        Primary care provider: Dino Harmon     Discharge Diagnoses:   Patient Active Problem List   Diagnosis    CAD (coronary artery disease)    Hyperlipidemia    HTN (hypertension)    Nonrheumatic aortic valve stenosis    Non-rheumatic mitral regurgitation    Cardiomyopathy ([address]    Irregular heart beat    Chronic systolic congestive heart failure ([address]    Nonrheumatic aortic valve stenosis    S/P TAVR (transcatheter aortic valve replacement)    Arthritis of both knees         Discharge Functional Status:    Physical therapy:  Bed Mobility: Scooting: Independent  Transfers: Sit to Stand: Modified independent  Stand to sit: Modified independent  Bed to Chair: Contact guard assistance, Stand by assistance(turning and backing up with wheeled walker)  Comment: Pt had complaints of dizzy and lightheaded throughout therapy. BP running low in both seated and standing (80s/40s in sitting and 70s/30s in standing). Nurse and Dr Dionisio Kyle notified. , Ambulation 1  Surface: level tile  Device: Rolling Walker  Assistance: Stand by assistance  Quality of Gait: knees remain flexed throughout ambulation, but lacking 15-20* extension even passively, forward flexion of hips and low back during ambulation, today pt dragging feet  Gait Deviations: Slow Jeri, Decreased step height  Distance: 220' x 2 with rest breaks, multiple short distances in gym  Comments: Pt required frequent reminders to clear feet. Pt had one episode of LOB when turning that required brief CGA assist. Return walk with cues to slow down and take time on turns - no LOB.  , Stairs  # Steps : 4  Stairs Height: 6\"  Rails: Bilateral  Curbs: 6\"  Device: No Device, Rolling walker(RW for curb)  Assistance: Contact guard

## 2021-02-03 NOTE — PROGRESS NOTES
Occupational Therapy  Facility/Department: [address] [facility]  Daily Treatment Note   If pt is d/c'd prior to next tx session this note will serve as d/c summary. NAME: Marilyn Brown  : 1932  MRN: [id number]    Date of Service: 2019    Discharge Recommendations:  Home with assist PRN, Home with Home health OT  OT Equipment Recommendations  Equipment Needed: No  Other: He has a shower chair and bedside commode to use over toilet at home. No further bathroom equipment is needed. Assessment   Performance deficits / Impairments: Decreased high-level IADLs;Decreased balance;Decreased endurance;Decreased functional mobility ; Decreased ADL status  Assessment: Pt has made significant gains since admission to rehab. Transfers have improved to CGA/SBA, functional mobility CGA/SBA using rolling walker. Anticipate pt will be ready for discharge by  this week with home OT services. He has a shower chair and bedside commode to use over toilet at home. No further bathroom equipment is needed. Treatment Diagnosis: decreased ADL, IADL, mobility, balance  Prognosis: Good  OT Education: OT Role;Energy Conservation  REQUIRES OT FOLLOW UP: Yes  Activity Tolerance  Activity Tolerance: Patient limited by fatigue  Activity Tolerance: Pt c/o feeling \"just tired\" throughout session. REports he did not sleep well. Denies dizziness or lightheadedness but BP in sitting is 85/58 and in standing is 77/46. RNAkash notified. Review energy conservation techs for d/c home to ensure for safety when fatigued  Safety Devices  Safety Devices in place: Yes  Type of devices: Call light within reach; Chair alarm in place; Left in chair;Nurse notified;Gait belt(Darlin VERMA)         Patient Diagnosis(es): There were no encounter diagnoses.       has a past medical history of A-fib [facility]), Aortic stenosis, Atrial fibrillation ([facility] [address] Biclonal gammopathy, BPH (benign prostatic hyperplasia), CAD (coronary artery disease), [facility] 2:  dress indep with AD  Long term goal 3: toilet indep with AD   Long term goal 4: transfer indep with AD  Long term goal 5: functional mobility and simple meal prep indep with AD  Patient Goals   Patient goals : \"I want to be able to walk, get back to my house, be able to take care of myself       Therapy Time   Individual Concurrent Group Co-treatment   Time In [phone]         Time Out 0905         Minutes 92         Timed Code Treatment Minutes: 92 Minutes     OT license: Sylvain Wolfe OT   Electronically signed by Satish Gasca OT on 9/14/2019 at 9:25 AM Protopic Counseling: Patient may experience a mild burning sensation during topical application. Protopic is not approved in children less than 2 years of age. There have been case reports of hematologic and skin malignancies in patients using topical calcineurin inhibitors although causality is questionable.